# Patient Record
Sex: FEMALE | URBAN - METROPOLITAN AREA
[De-identification: names, ages, dates, MRNs, and addresses within clinical notes are randomized per-mention and may not be internally consistent; named-entity substitution may affect disease eponyms.]

---

## 2017-01-18 ENCOUNTER — APPOINTMENT (RX ONLY)
Dept: URBAN - METROPOLITAN AREA CLINIC 24 | Facility: CLINIC | Age: 57
Setting detail: DERMATOLOGY
End: 2017-01-18

## 2017-01-18 DIAGNOSIS — Z41.9 ENCOUNTER FOR PROCEDURE FOR PURPOSES OTHER THAN REMEDYING HEALTH STATE, UNSPECIFIED: ICD-10-CM

## 2017-01-18 PROBLEM — E78.5 HYPERLIPIDEMIA, UNSPECIFIED: Status: ACTIVE | Noted: 2017-01-18

## 2017-01-18 PROBLEM — E03.9 HYPOTHYROIDISM, UNSPECIFIED: Status: ACTIVE | Noted: 2017-01-18

## 2017-01-18 PROBLEM — M12.9 ARTHROPATHY, UNSPECIFIED: Status: ACTIVE | Noted: 2017-01-18

## 2017-01-18 PROBLEM — F32.9 MAJOR DEPRESSIVE DISORDER, SINGLE EPISODE, UNSPECIFIED: Status: ACTIVE | Noted: 2017-01-18

## 2017-01-18 PROBLEM — F41.9 ANXIETY DISORDER, UNSPECIFIED: Status: ACTIVE | Noted: 2017-01-18

## 2017-01-18 PROCEDURE — ? GENTLELASE

## 2017-01-18 ASSESSMENT — LOCATION DETAILED DESCRIPTION DERM
LOCATION DETAILED: LEFT AXILLARY TAIL OF BREAST
LOCATION DETAILED: LEFT DISTAL PRETIBIAL REGION
LOCATION DETAILED: RIGHT ANTERIOR PROXIMAL THIGH
LOCATION DETAILED: RIGHT DISTAL CALF
LOCATION DETAILED: LEFT DISTAL CALF
LOCATION DETAILED: RIGHT ANTERIOR PROXIMAL UPPER ARM
LOCATION DETAILED: LEFT ANTERIOR PROXIMAL THIGH
LOCATION DETAILED: RIGHT DISTAL PRETIBIAL REGION

## 2017-01-18 ASSESSMENT — LOCATION SIMPLE DESCRIPTION DERM
LOCATION SIMPLE: LEFT CALF
LOCATION SIMPLE: LEFT BREAST
LOCATION SIMPLE: LEFT PRETIBIAL REGION
LOCATION SIMPLE: RIGHT UPPER ARM
LOCATION SIMPLE: RIGHT CALF
LOCATION SIMPLE: LEFT THIGH
LOCATION SIMPLE: RIGHT THIGH
LOCATION SIMPLE: RIGHT PRETIBIAL REGION

## 2017-01-18 ASSESSMENT — LOCATION ZONE DERM
LOCATION ZONE: TRUNK
LOCATION ZONE: ARM
LOCATION ZONE: LEG

## 2017-05-01 PROBLEM — J30.2 SEASONAL ALLERGIC RHINITIS: Status: ACTIVE | Noted: 2017-05-01

## 2017-06-23 PROBLEM — R91.1 PULMONARY NODULE: Status: ACTIVE | Noted: 2017-06-23

## 2017-10-07 PROBLEM — C44.91 BASAL CELL CARCINOMA: Status: ACTIVE | Noted: 2017-10-07

## 2017-10-09 ENCOUNTER — HOSPITAL ENCOUNTER (OUTPATIENT)
Dept: CT IMAGING | Age: 57
Discharge: HOME | End: 2017-10-09
Attending: INTERNAL MEDICINE
Payer: OTHER GOVERNMENT

## 2017-10-09 DIAGNOSIS — R91.1 PULMONARY NODULE: ICD-10-CM

## 2017-10-09 PROCEDURE — 71250 CT THORAX DX C-: CPT

## 2017-10-12 NOTE — PROGRESS NOTES
Chest CT scan did not reveal the presence of any nodules. Radiologist impression is that the lungs are clear. There appears to be resolution of possible lung nodules. He noted presence of a fatty liver.

## 2017-11-15 ENCOUNTER — APPOINTMENT (RX ONLY)
Dept: URBAN - METROPOLITAN AREA CLINIC 24 | Facility: CLINIC | Age: 57
Setting detail: DERMATOLOGY
End: 2017-11-15

## 2017-11-15 DIAGNOSIS — Z41.9 ENCOUNTER FOR PROCEDURE FOR PURPOSES OTHER THAN REMEDYING HEALTH STATE, UNSPECIFIED: ICD-10-CM

## 2017-11-15 PROCEDURE — ? GENTLELASE

## 2017-11-15 ASSESSMENT — LOCATION DETAILED DESCRIPTION DERM
LOCATION DETAILED: LEFT DISTAL PRETIBIAL REGION
LOCATION DETAILED: RIGHT ANTERIOR PROXIMAL UPPER ARM
LOCATION DETAILED: RIGHT PROXIMAL PRETIBIAL REGION
LOCATION DETAILED: LEFT LATERAL BREAST 2-3:00 REGION

## 2017-11-15 ASSESSMENT — LOCATION SIMPLE DESCRIPTION DERM
LOCATION SIMPLE: LEFT BREAST
LOCATION SIMPLE: RIGHT PRETIBIAL REGION
LOCATION SIMPLE: LEFT PRETIBIAL REGION
LOCATION SIMPLE: RIGHT UPPER ARM

## 2017-11-15 ASSESSMENT — LOCATION ZONE DERM
LOCATION ZONE: LEG
LOCATION ZONE: TRUNK
LOCATION ZONE: ARM

## 2017-11-15 NOTE — PROCEDURE: GENTLELASE
Endpoint: Seborrheic Keratoses: Immediate endpoint: perilesional erythema and edema. Vaseline and ice applied. Post care reviewed with patient.
External Cooling Fan Speed: 1
Cooling: DCD 50/50
Indication: Laser Hair Removal
Fluence: 16
Laser Type: Alexandrite 755nm
External Cooling: Juan Cryo 6
Spot Size: 18 mm
Pulse Duration: 3 ms
Consent: Written consent obtained, risks reviewed including but not limited to crusting, scabbing, blistering, scarring, darker or lighter pigmentary change, paradoxical hair regrowth, incomplete removal of hair and infection.
Total Pulses: 42
Detail Level: Detailed
Endpoint: Laser Hair Removal: Immediate endpoint: perifollicular erythema and edema. Vaseline and ice applied. Post care reviewed with patient.\\n\\n
Post-Care Instructions: I reviewed with the patient in detail post-care instructions. Patient should avoid sun for a minimum of 4 weeks before and after treatment.

## 2018-02-12 ENCOUNTER — APPOINTMENT (RX ONLY)
Dept: URBAN - METROPOLITAN AREA CLINIC 24 | Facility: CLINIC | Age: 58
Setting detail: DERMATOLOGY
End: 2018-02-12

## 2018-02-12 DIAGNOSIS — Z41.9 ENCOUNTER FOR PROCEDURE FOR PURPOSES OTHER THAN REMEDYING HEALTH STATE, UNSPECIFIED: ICD-10-CM

## 2018-02-12 PROBLEM — M12.9 ARTHROPATHY, UNSPECIFIED: Status: ACTIVE | Noted: 2018-02-12

## 2018-02-12 PROBLEM — F41.9 ANXIETY DISORDER, UNSPECIFIED: Status: ACTIVE | Noted: 2018-02-12

## 2018-02-12 PROBLEM — L55.1 SUNBURN OF SECOND DEGREE: Status: ACTIVE | Noted: 2018-02-12

## 2018-02-12 PROCEDURE — ? GENTLELASE

## 2018-02-12 ASSESSMENT — LOCATION DETAILED DESCRIPTION DERM
LOCATION DETAILED: RIGHT INFERIOR MEDIAL MALAR CHEEK
LOCATION DETAILED: LEFT UPPER CUTANEOUS LIP

## 2018-02-12 ASSESSMENT — LOCATION ZONE DERM
LOCATION ZONE: LIP
LOCATION ZONE: FACE

## 2018-02-12 ASSESSMENT — LOCATION SIMPLE DESCRIPTION DERM
LOCATION SIMPLE: RIGHT CHEEK
LOCATION SIMPLE: LEFT LIP

## 2018-02-12 NOTE — PROCEDURE: GENTLELASE
Spot Size: 18 mm
Detail Level: Detailed
Total Pulses: 55
Endpoint: Lentigines: Immediate endpoint: perilesional erythema and edema. Vaseline and ice applied. Post care reviewed with patient.
Cooling: DCD 50/50
External Cooling Fan Speed: 1
Price (Use Numbers Only, No Special Characters Or $): 375.00
Indication: Laser Hair Removal
Endpoint: Laser Hair Removal: Immediate endpoint: perifollicular erythema and edema. Vaseline and ice applied. Post care reviewed with patient.
Pulse Duration: 3 ms
External Cooling: Juan Cryo 6
Post-Care Instructions: I reviewed with the patient in detail post-care instructions. Patient should avoid sun for a minimum of 4 weeks before and after treatment.
Laser Type: Alexandrite 755nm
Fluence: 10
Consent: Written consent obtained, risks reviewed including but not limited to crusting, scabbing, blistering, scarring, darker or lighter pigmentary change, paradoxical hair regrowth, incomplete removal of hair and infection.

## 2018-03-06 ENCOUNTER — APPOINTMENT (RX ONLY)
Dept: URBAN - METROPOLITAN AREA CLINIC 24 | Facility: CLINIC | Age: 58
Setting detail: DERMATOLOGY
End: 2018-03-06

## 2018-03-06 DIAGNOSIS — Z41.9 ENCOUNTER FOR PROCEDURE FOR PURPOSES OTHER THAN REMEDYING HEALTH STATE, UNSPECIFIED: ICD-10-CM

## 2018-03-06 PROBLEM — E78.5 HYPERLIPIDEMIA, UNSPECIFIED: Status: ACTIVE | Noted: 2018-03-06

## 2018-03-06 PROBLEM — L70.0 ACNE VULGARIS: Status: ACTIVE | Noted: 2018-03-06

## 2018-03-06 PROCEDURE — ? GENTLELASE

## 2018-03-06 ASSESSMENT — LOCATION SIMPLE DESCRIPTION DERM: LOCATION SIMPLE: LEFT LIP

## 2018-03-06 ASSESSMENT — LOCATION ZONE DERM: LOCATION ZONE: LIP

## 2018-03-06 ASSESSMENT — LOCATION DETAILED DESCRIPTION DERM: LOCATION DETAILED: LEFT SUPERIOR VERMILION LIP

## 2018-03-06 NOTE — PROCEDURE: GENTLELASE
Indication: Laser Hair Removal
Consent: Written consent obtained, risks reviewed including but not limited to crusting, scabbing, blistering, scarring, darker or lighter pigmentary change, paradoxical hair regrowth, incomplete removal of hair and infection.
Total Pulses: 39
Spot Size: 18 mm
External Cooling Fan Speed: 1
Endpoint: Seborrheic Keratoses: Immediate endpoint: perilesional erythema and edema. Vaseline and ice applied. Post care reviewed with patient.
Cooling: DCD 50/50
Fluence: 10
Laser Type: Alexandrite 755nm
Post-Care Instructions: I reviewed with the patient in detail post-care instructions. Patient should avoid sun for a minimum of 4 weeks before and after treatment.
Endpoint: Laser Hair Removal: Immediate endpoint: perifollicular erythema and edema. Vaseline and ice applied. Post care reviewed with patient.
External Cooling: Juan Cryo 6
Detail Level: Detailed
Pulse Duration: 3 ms

## 2018-04-09 ENCOUNTER — APPOINTMENT (RX ONLY)
Dept: URBAN - METROPOLITAN AREA CLINIC 24 | Facility: CLINIC | Age: 58
Setting detail: DERMATOLOGY
End: 2018-04-09

## 2018-04-09 DIAGNOSIS — Z41.9 ENCOUNTER FOR PROCEDURE FOR PURPOSES OTHER THAN REMEDYING HEALTH STATE, UNSPECIFIED: ICD-10-CM

## 2018-04-09 PROCEDURE — ? GENTLELASE

## 2018-04-09 ASSESSMENT — LOCATION SIMPLE DESCRIPTION DERM
LOCATION SIMPLE: LEFT LIP
LOCATION SIMPLE: RIGHT LIP

## 2018-04-09 ASSESSMENT — LOCATION DETAILED DESCRIPTION DERM
LOCATION DETAILED: RIGHT UPPER CUTANEOUS LIP
LOCATION DETAILED: LEFT UPPER CUTANEOUS LIP

## 2018-04-09 ASSESSMENT — LOCATION ZONE DERM: LOCATION ZONE: LIP

## 2018-04-09 NOTE — PROCEDURE: GENTLELASE
Endpoint: Laser Hair Removal: Immediate endpoint: perifollicular erythema and edema. Vaseline and ice applied. Post care reviewed with patient.
Post-Care Instructions: I reviewed with the patient in detail post-care instructions. Patient should avoid sun for a minimum of 4 weeks before and after treatment.
External Cooling: Juan Cryo 6
Indication: Laser Hair Removal
Endpoint: Lentigines: Immediate endpoint: perilesional erythema and edema. Vaseline and ice applied. Post care reviewed with patient.
Total Pulses: 230
Pulse Duration: 3 ms
Detail Level: Detailed
Cooling: DCD 50/50
External Cooling Fan Speed: 1
Fluence: 14
Consent: Written consent obtained, risks reviewed including but not limited to crusting, scabbing, blistering, scarring, darker or lighter pigmentary change, paradoxical hair regrowth, incomplete removal of hair and infection.
Laser Type: Alexandrite 755nm
Spot Size: 18 mm

## 2018-05-07 ENCOUNTER — APPOINTMENT (RX ONLY)
Dept: URBAN - METROPOLITAN AREA CLINIC 24 | Facility: CLINIC | Age: 58
Setting detail: DERMATOLOGY
End: 2018-05-07

## 2018-05-07 DIAGNOSIS — Z41.9 ENCOUNTER FOR PROCEDURE FOR PURPOSES OTHER THAN REMEDYING HEALTH STATE, UNSPECIFIED: ICD-10-CM

## 2018-05-07 PROBLEM — M12.9 ARTHROPATHY, UNSPECIFIED: Status: ACTIVE | Noted: 2018-05-07

## 2018-05-07 PROBLEM — E03.9 HYPOTHYROIDISM, UNSPECIFIED: Status: ACTIVE | Noted: 2018-05-07

## 2018-05-07 PROBLEM — L55.1 SUNBURN OF SECOND DEGREE: Status: ACTIVE | Noted: 2018-05-07

## 2018-05-07 PROCEDURE — ? GENTLELASE

## 2018-05-07 ASSESSMENT — LOCATION DETAILED DESCRIPTION DERM
LOCATION DETAILED: LEFT UPPER CUTANEOUS LIP
LOCATION DETAILED: RIGHT SUPERIOR MEDIAL BUCCAL CHEEK
LOCATION DETAILED: PHILTRUM

## 2018-05-07 ASSESSMENT — LOCATION SIMPLE DESCRIPTION DERM
LOCATION SIMPLE: LEFT LIP
LOCATION SIMPLE: RIGHT CHEEK
LOCATION SIMPLE: UPPER LIP

## 2018-05-07 ASSESSMENT — LOCATION ZONE DERM
LOCATION ZONE: FACE
LOCATION ZONE: LIP

## 2018-05-07 NOTE — PROCEDURE: GENTLELASE
External Cooling Fan Speed: 1
Total Pulses: 15
Cooling: DCD 50/50
Post-Care Instructions: I reviewed with the patient in detail post-care instructions. Patient should avoid sun for a minimum of 4 weeks before and after treatment.
Detail Level: Detailed
Laser Type: Alexandrite 755nm
Fluence: 12
Endpoint: Lentigines: Immediate endpoint: perilesional erythema and edema. Vaseline and ice applied. Post care reviewed with patient.
Indication: Laser Hair Removal
Pulse Duration: 3 ms
External Cooling: Ujan Cryo 6
Consent: Written consent obtained, risks reviewed including but not limited to crusting, scabbing, blistering, scarring, darker or lighter pigmentary change, paradoxical hair regrowth, incomplete removal of hair and infection.
Endpoint: Laser Hair Removal: Immediate endpoint: perifollicular erythema and edema. Vaseline and ice applied. Post care reviewed with patient.
Spot Size: 18 mm

## 2018-06-11 ENCOUNTER — APPOINTMENT (RX ONLY)
Dept: URBAN - METROPOLITAN AREA CLINIC 24 | Facility: CLINIC | Age: 58
Setting detail: DERMATOLOGY
End: 2018-06-11

## 2018-06-11 DIAGNOSIS — Z41.9 ENCOUNTER FOR PROCEDURE FOR PURPOSES OTHER THAN REMEDYING HEALTH STATE, UNSPECIFIED: ICD-10-CM

## 2018-06-11 PROBLEM — F32.9 MAJOR DEPRESSIVE DISORDER, SINGLE EPISODE, UNSPECIFIED: Status: ACTIVE | Noted: 2018-06-11

## 2018-06-11 PROBLEM — L70.0 ACNE VULGARIS: Status: ACTIVE | Noted: 2018-06-11

## 2018-06-11 PROBLEM — F41.9 ANXIETY DISORDER, UNSPECIFIED: Status: ACTIVE | Noted: 2018-06-11

## 2018-06-11 PROBLEM — E03.9 HYPOTHYROIDISM, UNSPECIFIED: Status: ACTIVE | Noted: 2018-06-11

## 2018-06-11 PROBLEM — L55.1 SUNBURN OF SECOND DEGREE: Status: ACTIVE | Noted: 2018-06-11

## 2018-06-11 PROBLEM — M12.9 ARTHROPATHY, UNSPECIFIED: Status: ACTIVE | Noted: 2018-06-11

## 2018-06-11 PROBLEM — E78.5 HYPERLIPIDEMIA, UNSPECIFIED: Status: ACTIVE | Noted: 2018-06-11

## 2018-06-11 PROCEDURE — ? GENTLELASE

## 2018-06-11 ASSESSMENT — LOCATION DETAILED DESCRIPTION DERM
LOCATION DETAILED: RIGHT UPPER CUTANEOUS LIP
LOCATION DETAILED: PHILTRUM
LOCATION DETAILED: LEFT SUPERIOR MEDIAL BUCCAL CHEEK

## 2018-06-11 ASSESSMENT — LOCATION ZONE DERM
LOCATION ZONE: FACE
LOCATION ZONE: LIP

## 2018-06-11 ASSESSMENT — LOCATION SIMPLE DESCRIPTION DERM
LOCATION SIMPLE: RIGHT LIP
LOCATION SIMPLE: UPPER LIP
LOCATION SIMPLE: LEFT CHEEK

## 2018-06-11 NOTE — PROCEDURE: GENTLELASE
Consent: Written consent obtained, risks reviewed including but not limited to crusting, scabbing, blistering, scarring, darker or lighter pigmentary change, paradoxical hair regrowth, incomplete removal of hair and infection.
Pulse Duration: 3 ms
Laser Type: Alexandrite 755nm
Endpoint: Lentigines: Immediate endpoint: perilesional erythema and edema. Vaseline and ice applied. Post care reviewed with patient.
Fluence: 12
Post-Care Instructions: I reviewed with the patient in detail post-care instructions. Patient should avoid sun for a minimum of 4 weeks before and after treatment.
Spot Size: 18 mm
Detail Level: Detailed
Endpoint: Laser Hair Removal: Immediate endpoint: perifollicular erythema and edema. Vaseline and ice applied. Post care reviewed with patient.
External Cooling: Juan Cryo 6
External Cooling Fan Speed: 1
Cooling: DCD 50/50
Indication: Laser Hair Removal
Total Pulses: 11

## 2018-07-12 ENCOUNTER — APPOINTMENT (RX ONLY)
Dept: URBAN - METROPOLITAN AREA CLINIC 24 | Facility: CLINIC | Age: 58
Setting detail: DERMATOLOGY
End: 2018-07-12

## 2018-07-12 DIAGNOSIS — Z41.9 ENCOUNTER FOR PROCEDURE FOR PURPOSES OTHER THAN REMEDYING HEALTH STATE, UNSPECIFIED: ICD-10-CM

## 2018-07-12 PROCEDURE — ? GENTLELASE

## 2018-07-12 ASSESSMENT — LOCATION DETAILED DESCRIPTION DERM
LOCATION DETAILED: RIGHT LOWER CUTANEOUS LIP
LOCATION DETAILED: LEFT SUPERIOR CENTRAL BUCCAL CHEEK
LOCATION DETAILED: PHILTRUM

## 2018-07-12 ASSESSMENT — LOCATION ZONE DERM
LOCATION ZONE: FACE
LOCATION ZONE: LIP

## 2018-07-12 ASSESSMENT — LOCATION SIMPLE DESCRIPTION DERM
LOCATION SIMPLE: UPPER LIP
LOCATION SIMPLE: RIGHT LIP
LOCATION SIMPLE: LEFT CHEEK

## 2018-07-12 NOTE — PROCEDURE: GENTLELASE
Laser Type: Alexandrite 755nm
Total Pulses: 15
Fluence: 10
Detail Level: Detailed
External Cooling Fan Speed: 1
Consent: Written consent obtained, risks reviewed including but not limited to crusting, scabbing, blistering, scarring, darker or lighter pigmentary change, paradoxical hair regrowth, incomplete removal of hair and infection.
Endpoint: Laser Hair Removal: Immediate endpoint: perifollicular erythema and edema. Vaseline and ice applied. Post care reviewed with patient.
Pulse Duration: 3 ms
Spot Size: 18 mm
Post-Care Instructions: I reviewed with the patient in detail post-care instructions. Patient should avoid sun for a minimum of 4 weeks before and after treatment.
External Cooling: Juan Cryo 6
Indication: Laser Hair Removal
Cooling: DCD 50/50
Endpoint: Seborrheic Keratoses: Immediate endpoint: perilesional erythema and edema. Vaseline and ice applied. Post care reviewed with patient.

## 2018-08-23 ENCOUNTER — APPOINTMENT (RX ONLY)
Dept: URBAN - METROPOLITAN AREA CLINIC 24 | Facility: CLINIC | Age: 58
Setting detail: DERMATOLOGY
End: 2018-08-23

## 2018-08-23 DIAGNOSIS — Z41.9 ENCOUNTER FOR PROCEDURE FOR PURPOSES OTHER THAN REMEDYING HEALTH STATE, UNSPECIFIED: ICD-10-CM

## 2018-08-23 PROBLEM — F41.9 ANXIETY DISORDER, UNSPECIFIED: Status: ACTIVE | Noted: 2018-08-23

## 2018-08-23 PROCEDURE — ? GENTLELASE

## 2018-08-23 ASSESSMENT — LOCATION ZONE DERM: LOCATION ZONE: LIP

## 2018-08-23 ASSESSMENT — LOCATION DETAILED DESCRIPTION DERM: LOCATION DETAILED: RIGHT UPPER CUTANEOUS LIP

## 2018-08-23 ASSESSMENT — LOCATION SIMPLE DESCRIPTION DERM: LOCATION SIMPLE: RIGHT LIP

## 2018-08-23 NOTE — PROCEDURE: GENTLELASE
Consent: Written consent obtained, risks reviewed including but not limited to crusting, scabbing, blistering, scarring, darker or lighter pigmentary change, paradoxical hair regrowth, incomplete removal of hair and infection.
Fluence: 12
External Cooling Fan Speed: 1
Detail Level: Detailed
Cooling: DCD 50/50
Laser Type: Alexandrite 755nm
External Cooling: Juan Cryo 6
Endpoint: Seborrheic Keratoses: Immediate endpoint: perilesional erythema and edema. Vaseline and ice applied. Post care reviewed with patient.
Spot Size: 18 mm
Post-Care Instructions: I reviewed with the patient in detail post-care instructions. Patient should avoid sun for a minimum of 4 weeks before and after treatment.
Pulse Duration: 3 ms
Total Pulses: 35
Indication: Laser Hair Removal
Endpoint: Laser Hair Removal: Immediate endpoint: perifollicular erythema and edema. Vaseline and ice applied. Post care reviewed with patient.

## 2018-10-22 ENCOUNTER — APPOINTMENT (RX ONLY)
Dept: URBAN - METROPOLITAN AREA CLINIC 23 | Facility: CLINIC | Age: 58
Setting detail: DERMATOLOGY
End: 2018-10-22

## 2018-10-22 DIAGNOSIS — Z41.9 ENCOUNTER FOR PROCEDURE FOR PURPOSES OTHER THAN REMEDYING HEALTH STATE, UNSPECIFIED: ICD-10-CM

## 2018-10-22 PROBLEM — L70.0 ACNE VULGARIS: Status: ACTIVE | Noted: 2018-10-22

## 2018-10-22 PROCEDURE — ? GENTLELASE

## 2018-10-22 ASSESSMENT — LOCATION SIMPLE DESCRIPTION DERM
LOCATION SIMPLE: RIGHT PRETIBIAL REGION
LOCATION SIMPLE: LEFT PRETIBIAL REGION
LOCATION SIMPLE: UPPER LIP

## 2018-10-22 ASSESSMENT — LOCATION ZONE DERM
LOCATION ZONE: LIP
LOCATION ZONE: LEG

## 2018-10-22 ASSESSMENT — LOCATION DETAILED DESCRIPTION DERM
LOCATION DETAILED: RIGHT PROXIMAL PRETIBIAL REGION
LOCATION DETAILED: LEFT PROXIMAL PRETIBIAL REGION
LOCATION DETAILED: PHILTRUM

## 2018-10-22 NOTE — PROCEDURE: GENTLELASE
Endpoint: Laser Hair Removal: Immediate endpoint: perifollicular erythema and edema. Vaseline and ice applied. Post care reviewed with patient.
Consent: Written consent obtained, risks reviewed including but not limited to crusting, scabbing, blistering, scarring, darker or lighter pigmentary change, paradoxical hair regrowth, incomplete removal of hair and infection.
Laser Type: Alexandrite 755nm
Endpoint: Seborrheic Keratoses: Immediate endpoint: perilesional erythema and edema. Vaseline and ice applied. Post care reviewed with patient.
Cooling: DCD 50/50
External Cooling: Juan Cryo 6
Total Pulses: 185
Detail Level: Detailed
Spot Size: 18 mm
Fluence: 16
Pulse Duration: 3 ms
Indication: Laser Hair Removal
Post-Care Instructions: I reviewed with the patient in detail post-care instructions. Patient should avoid sun for a minimum of 4 weeks before and after treatment.
External Cooling Fan Speed: 1

## 2019-01-15 ENCOUNTER — APPOINTMENT (RX ONLY)
Dept: URBAN - METROPOLITAN AREA CLINIC 23 | Facility: CLINIC | Age: 59
Setting detail: DERMATOLOGY
End: 2019-01-15

## 2019-01-15 DIAGNOSIS — Z41.9 ENCOUNTER FOR PROCEDURE FOR PURPOSES OTHER THAN REMEDYING HEALTH STATE, UNSPECIFIED: ICD-10-CM

## 2019-01-15 PROCEDURE — ? GENTLELASE

## 2019-01-15 ASSESSMENT — LOCATION DETAILED DESCRIPTION DERM
LOCATION DETAILED: RIGHT LATERAL MALAR CHEEK
LOCATION DETAILED: RIGHT ANKLE
LOCATION DETAILED: LEFT POSTERIOR ANKLE
LOCATION DETAILED: RIGHT PROXIMAL PRETIBIAL REGION
LOCATION DETAILED: RIGHT CHIN
LOCATION DETAILED: LEFT CENTRAL BUCCAL CHEEK
LOCATION DETAILED: LEFT UPPER CUTANEOUS LIP
LOCATION DETAILED: LEFT INFERIOR CENTRAL MALAR CHEEK
LOCATION DETAILED: LEFT PROXIMAL CALF
LOCATION DETAILED: RIGHT SUPERIOR LATERAL BUCCAL CHEEK

## 2019-01-15 ASSESSMENT — LOCATION SIMPLE DESCRIPTION DERM
LOCATION SIMPLE: LEFT CALF
LOCATION SIMPLE: RIGHT CHEEK
LOCATION SIMPLE: LEFT CHEEK
LOCATION SIMPLE: LEFT LIP
LOCATION SIMPLE: LEFT ANKLE
LOCATION SIMPLE: CHIN
LOCATION SIMPLE: RIGHT PRETIBIAL REGION
LOCATION SIMPLE: RIGHT ANKLE

## 2019-01-15 ASSESSMENT — LOCATION ZONE DERM
LOCATION ZONE: FACE
LOCATION ZONE: LIP
LOCATION ZONE: LEG

## 2019-01-15 NOTE — PROCEDURE: GENTLELASE
Indication: Laser Hair Removal
Total Pulses: 50
Price (Use Numbers Only, No Special Characters Or $): 0
Detail Level: Detailed
Endpoint: Seborrheic Keratoses: Immediate endpoint: perilesional erythema and edema. Vaseline and ice applied. Post care reviewed with patient.
Spot Size: 18 mm
Endpoint: Laser Hair Removal: Immediate endpoint: perifollicular erythema and edema. Vaseline and ice applied. Post care reviewed with patient.
Post-Care Instructions: I reviewed with the patient in detail post-care instructions. Patient should avoid sun for a minimum of 4 weeks before and after treatment.
Laser Type: Alexandrite 755nm
Cooling: DCD setting
Pulse Duration: 3 ms
Consent: Written consent obtained, risks reviewed including but not limited to crusting, scabbing, blistering, scarring, darker or lighter pigmentary change, paradoxical hair regrowth, incomplete removal of hair and infection.
Fluence: 14

## 2019-01-28 PROBLEM — N94.10 DYSPAREUNIA, FEMALE: Status: ACTIVE | Noted: 2019-01-28

## 2019-01-28 PROBLEM — N89.6 TIGHT INTROITUS: Status: ACTIVE | Noted: 2019-01-28

## 2019-03-18 ENCOUNTER — HOSPITAL ENCOUNTER (OUTPATIENT)
Dept: DIABETES SERVICES | Age: 59
Discharge: HOME | End: 2019-03-18
Payer: OTHER GOVERNMENT

## 2019-03-18 PROCEDURE — G0108 DIAB MANAGE TRN  PER INDIV: HCPCS

## 2019-04-04 ENCOUNTER — HOSPITAL ENCOUNTER (OUTPATIENT)
Dept: DIABETES SERVICES | Age: 59
Discharge: HOME | End: 2019-04-04
Payer: OTHER GOVERNMENT

## 2019-04-04 PROCEDURE — G0109 DIAB MANAGE TRN IND/GROUP: HCPCS

## 2019-04-09 ENCOUNTER — HOSPITAL ENCOUNTER (OUTPATIENT)
Dept: DIABETES SERVICES | Age: 59
Discharge: HOME | End: 2019-04-09
Payer: OTHER GOVERNMENT

## 2019-04-09 PROCEDURE — G0109 DIAB MANAGE TRN IND/GROUP: HCPCS

## 2019-04-09 NOTE — PROGRESS NOTES
Participant attended Diabetes #1 session today. Topics included: Characteristics/pathophysiology type 1/type 2 diabetes; Goal/acceptable blood glucose ranges/Hgb A1C/interpreting/using results;meters, continuous glucose monitors and insulin pumps. Using medications safely; Sick day management; Prevention/detection/treatment of acute complications. - Verbalized understanding of material covered. -Goal for next session Nutrition Two  
-Anticipated adherence is good  
-Problems/barriers may be none anticipated

## 2019-04-26 ENCOUNTER — HOSPITAL ENCOUNTER (OUTPATIENT)
Dept: DIABETES SERVICES | Age: 59
Discharge: HOME | End: 2019-04-26
Payer: OTHER GOVERNMENT

## 2019-04-26 PROBLEM — K76.0 HEPATIC STEATOSIS DETERMINED BY BIOPSY OF LIVER: Status: ACTIVE | Noted: 2019-04-26

## 2019-04-26 PROBLEM — L98.9 HAND LESION: Status: ACTIVE | Noted: 2019-04-26

## 2019-04-26 PROCEDURE — G0109 DIAB MANAGE TRN IND/GROUP: HCPCS

## 2019-04-26 NOTE — PROGRESS NOTES
Attended nutrition diabetes #2 group session today. Topics included: plate method for portion control; fiber and sodium guidelines; sugar substitutes; alcohol; eating out; recipe modification; label reading. Participant's nutrition goal:To make heart healthier choices and lose 5 lbs, she will continue to use a 9 inch plate size at dinner and re-evaluate in 3 months. Participant's nutrition support plan:use the grocery shopping guide Barriers identified by participant: eating out Voiced/demonstrated understanding of material covered. Anticipated adherence is good. Plan for follow up is attend diabetes 2 class.

## 2019-04-30 ENCOUNTER — TELEPHONE (OUTPATIENT)
Dept: DIABETES SERVICES | Age: 59
End: 2019-04-30

## 2019-05-06 ENCOUNTER — HOSPITAL ENCOUNTER (OUTPATIENT)
Dept: ULTRASOUND IMAGING | Age: 59
Discharge: HOME | End: 2019-05-06
Attending: INTERNAL MEDICINE

## 2019-05-06 DIAGNOSIS — K76.0 HEPATIC STEATOSIS DETERMINED BY BIOPSY OF LIVER: ICD-10-CM

## 2019-07-24 ENCOUNTER — HOSPITAL ENCOUNTER (OUTPATIENT)
Dept: DIABETES SERVICES | Age: 59
Discharge: HOME | End: 2019-07-24
Payer: OTHER GOVERNMENT

## 2019-07-24 PROCEDURE — G0109 DIAB MANAGE TRN IND/GROUP: HCPCS

## 2019-07-24 NOTE — PROGRESS NOTES
Participant attended Diabetes #2 session today. Topics included: Prevention/detection/treatment of chronic complications; sleep apnea; Developing strategies to promote health/change behavior/recommended screenings; Developing strategies to address psychosocial issues; Goal setting. Participants goal/support plan includes  Diabetes Goal:  To find my trigger food I will check my blood sugars before and after meals and before bed and test 2-8 times a day for a month and then decide if check less often. Diabetes Plan: Have testing strips. Problems/barriers may be:none anticipated   Plan for follow up/Recommendations: mail follow up survey at 6 months and one year.

## 2020-11-03 LAB — MAMMOGRAPHY, EXTERNAL: NORMAL

## 2021-03-16 PROBLEM — G43.009 MIGRAINE WITHOUT AURA AND WITHOUT STATUS MIGRAINOSUS, NOT INTRACTABLE: Status: ACTIVE | Noted: 2021-03-16

## 2021-04-13 PROBLEM — E11.65 TYPE 2 DIABETES MELLITUS WITH HYPERGLYCEMIA, WITHOUT LONG-TERM CURRENT USE OF INSULIN (HCC): Status: ACTIVE | Noted: 2021-04-13

## 2021-04-28 ENCOUNTER — HOSPITAL ENCOUNTER (OUTPATIENT)
Dept: LAB | Age: 61
Discharge: HOME | End: 2021-04-28
Payer: OTHER GOVERNMENT

## 2021-04-28 DIAGNOSIS — E89.0 HYPOTHYROIDISM FOLLOWING RADIOIODINE THERAPY: ICD-10-CM

## 2021-04-28 LAB
T4 FREE SERPL-MCNC: 1.1 NG/DL (ref 0.9–1.8)
TSH SERPL DL<=0.005 MIU/L-ACNC: 7.38 UIU/ML (ref 0.36–3.74)

## 2021-04-28 PROCEDURE — 84443 ASSAY THYROID STIM HORMONE: CPT

## 2021-04-28 PROCEDURE — 36415 COLL VENOUS BLD VENIPUNCTURE: CPT

## 2021-04-28 PROCEDURE — 84439 ASSAY OF FREE THYROXINE: CPT

## 2021-07-26 ENCOUNTER — HOSPITAL ENCOUNTER (OUTPATIENT)
Dept: LAB | Age: 61
Discharge: HOME | End: 2021-07-26
Payer: OTHER GOVERNMENT

## 2021-07-26 DIAGNOSIS — E89.0 HYPOTHYROIDISM FOLLOWING RADIOIODINE THERAPY: ICD-10-CM

## 2021-07-26 LAB
T4 FREE SERPL-MCNC: 1.1 NG/DL (ref 0.9–1.8)
TSH SERPL DL<=0.005 MIU/L-ACNC: 1.23 UIU/ML (ref 0.36–3.74)

## 2021-07-26 PROCEDURE — 36415 COLL VENOUS BLD VENIPUNCTURE: CPT

## 2021-07-26 PROCEDURE — 84443 ASSAY THYROID STIM HORMONE: CPT

## 2021-07-26 PROCEDURE — 84439 ASSAY OF FREE THYROXINE: CPT

## 2021-11-23 LAB — MAMMOGRAPHY, EXTERNAL: NORMAL

## 2021-12-13 ENCOUNTER — HOSPITAL ENCOUNTER (OUTPATIENT)
Dept: GENERAL RADIOLOGY | Age: 61
Discharge: HOME | End: 2021-12-13

## 2021-12-13 DIAGNOSIS — M25.561 ACUTE PAIN OF RIGHT KNEE: ICD-10-CM

## 2021-12-13 DIAGNOSIS — M54.50 ACUTE RIGHT-SIDED LOW BACK PAIN, UNSPECIFIED WHETHER SCIATICA PRESENT: ICD-10-CM

## 2022-01-03 ENCOUNTER — HOSPITAL ENCOUNTER (OUTPATIENT)
Dept: PHYSICAL THERAPY | Age: 62
Discharge: HOME | End: 2022-01-03
Payer: OTHER GOVERNMENT

## 2022-01-03 DIAGNOSIS — M25.561 ACUTE PAIN OF RIGHT KNEE: ICD-10-CM

## 2022-01-03 DIAGNOSIS — M54.50 ACUTE RIGHT-SIDED LOW BACK PAIN, UNSPECIFIED WHETHER SCIATICA PRESENT: ICD-10-CM

## 2022-01-03 PROCEDURE — 97161 PT EVAL LOW COMPLEX 20 MIN: CPT

## 2022-01-03 PROCEDURE — 97110 THERAPEUTIC EXERCISES: CPT

## 2022-01-03 NOTE — PROGRESS NOTES
Jai Madera  : 1960  Primary: [facility]  Secondary:  [address] at [facility]  [address] [place], [address] [facility], [place], [address] [place] [address]  [phone]   [phone]      OUTPATIENT PHYSICAL THERAPY: Daily Treatment Note 1/3/2022    ICD-10: Pain in right knee (M25.561)             Low back pain (M54.4)             Other abnormalities of gait and mobility (R26.89)   Precautions: pre-diabetic, migraines  Allergies: Amoxicillin and Penicillins   TREATMENT PLAN:  Effective Dates: 1/3/2022 TO 3/4/2022 (60 days). Frequency/Duration: 2 times a week for 60 Day(s) MEDICAL/REFERRING DIAGNOSIS:  Acute pain of right knee [M25.561]  Acute right-sided low back pain, unspecified whether sciatica present [M54.50]   DATE OF ONSET: Patient reports her low back pain starting in November after lifting bags of mulch and working in her yard. REFERRING PHYSICIAN: Angella Bruner MD MD Orders: Evaluate and Treat   Return MD Appointment: 3/15/2022 for lab work, no appointment for low back/ knee     Pre-treatment Symptoms/Complaints:  Patient reports her  having back problems and being the one at home to move and carry heavy items which she feels contributed to her back pain. Reports feeling much better now than when it first started, but has constant low back pain and intermittent R knee pain depending on activities. Pain: Initial: Pain Intensity 1: 2  Pain Location 1: Back  Pain Orientation 1: Lower  Pain Intensity 2: 1  Pain Location 2: Knee  Pain Orientation 2: Right,Anterior  Post Session:  2/10   Medications Last Reviewed:  1/3/2022  Updated Objective Findings:  See evaluation note from today   TREATMENT:   THERAPEUTIC EXERCISE: (15 minutes):  Exercises per grid below to improve mobility, strength, balance and coordination.   Required moderate visual, verbal, manual and tactile cues to promote proper body alignment, promote proper body posture and promote proper body mechanics. Progressed resistance, range, repetitions and complexity of movement as indicated. Date:  1/3/2022 Date:   Date:     Activity/Exercise Parameters Parameters Parameters   LTR X 10 each      SKTC X 2 each     Hamstring stretch Strap, x 2 each     Piriformis stretch X 2 each                         Time spent with patient reviewing proper muscle recruitment and technique with exercises. MANUAL THERAPY: ( minutes): Joint mobilization, Soft tissue mobilization and Manipulation was utilized and necessary because of the patient's restricted joint motion, painful spasm, loss of articular motion and restricted motion of soft tissue   Consider soft tissue mobilization to lumbar region to decrease tightness and pain   Consider trigger point release to R piriformis to decrease tightness and spasms   Consider soft tissue mobilization to R lateral hip, gluteals, and piriformis to decrease pain and tightness    MODALITIES: (0 minutes):      none today     HEP: As above; handouts given to patient for all exercises. Treatment/Session Summary:    · Response to Treatment:  Patient with good understanding and tolerance for stretching exercises. Plan to progress flexibility, mobility, and stability as tolerated. .  · Communication/Consultation:  Spoke at length with patient about activity modification as needed, safety at home, and gradual stretching/ core stability program for pain  · Equipment provided today:  Patient given handout with above exercises for home  · Recommendations/Intent for next treatment session: Next visit will focus on pain control, manual therapy and modalities as needed, progression of flexibility, core stability program as tolerated, functional exercises.     Total Treatment Billable Duration:  45 minutes: 30 evaluation, 15 therapeutic exercise  PT Patient Time In/Time Out  Time In: 1870  Time Out: [facility]

## 2022-01-03 NOTE — THERAPY EVALUATION
Irineo Bowers  : 1960  Primary: [facility]  Secondary:  [address] at [facility]  [address] [place], [address] [place] [address], Iván villasenor, [address] [place] [address]  [phone]   [phone]       OUTPATIENT PHYSICAL THERAPY:Initial Assessment 1/3/2022    ICD-10: Pain in right knee (M25.561)             Low back pain (M54.4)             Other abnormalities of gait and mobility (R26.89)   Precautions: pre-diabetic, migraines  Allergies: Amoxicillin and Penicillins   TREATMENT PLAN:  Effective Dates: 1/3/2022 TO 3/4/2022 (60 days). Frequency/Duration: 2 times a week for 60 Day(s) MEDICAL/REFERRING DIAGNOSIS:  Acute pain of right knee [M25.561]  Acute right-sided low back pain, unspecified whether sciatica present [M54.50]   DATE OF ONSET: Patient reports her low back pain starting in November after lifting bags of mulch and working in her yard. REFERRING PHYSICIAN: Scotty Hardy MD MD Orders: Evaluate and Treat   Return MD Appointment: 3/15/2022 for lab work, no appointment for low back/ knee     INITIAL ASSESSMENT:  Ms. Anuradha Núñez is a 62 y.o. female presenting to physical therapy with complaints of  low back and R knee pain which began in November after mulching her yard. She reports her  having a back problem and having to do the heavy lifting at home which she feels contributed to her back pain. She has taken muscle relaxants with minimal change. X-ray indicative of mild osteoarthritis in her lumbar spine and R knee per chart review. Patient does report feeling better now than she did when it first started. Reports less R LE symptoms, but continues to have a constant ache in her lower back, intermittent R anterior knee pain, and occasional R LE symptoms. She is eager to reduce her symptoms and return to her prior activity level including yoga.  Patient presents with increased pain, decreased strength, decreased ROM, decreased flexibility, impaired gait, impaired transfer ability, decreased activity tolerance, and overall impaired functional mobility. Patient is a good candidate for skilled physical therapy interventions to include manual therapy, therapeutic exercise, balance training, gait training, transfer training, postural re-education, body mechanics training, and pain modalities as needed. PROBLEM LIST (Impacting functional limitations):  1. Decreased Strength  2. Decreased ADL/Functional Activities  3. Decreased Transfer Abilities  4. Decreased Ambulation Ability/Technique  5. Decreased Balance  6. Increased Pain  7. Decreased Activity Tolerance  8. Decreased Pacing Skills  9. Decreased Work Simplification/Energy Conservation Techniques  10. Decreased Flexibility/Joint Mobility INTERVENTIONS PLANNED: (Treatment may consist of any combination of the following)  1. Balance Exercise  2. Bed Mobility  3. Cold  4. Cryotherapy  5. Electrical Stimulation  6. Family Education  7. Gait Training  8. Heat  9. Home Exercise Program (HEP)  10. Manual Therapy  11. Neuromuscular Re-education/Strengthening  12. Range of Motion (ROM)  13. Therapeutic Activites  14. Therapeutic Exercise/Strengthening  15. Transfer Training  16. Ultrasound (US)  17. Trigger Point Dry Needling     GOALS: (Goals have been discussed and agreed upon with patient.)  Short-Term Functional Goals: Time Frame: 1/3/2022 to 2/3/2022  1. Patient demonstrates independence with home exercise program without verbal cueing provided by therapist.   2. Patient will report no more than 1/10 low back pain at rest in order to demonstrate improved self pain control and tolerance. 3. Patient will be educated in and demonstrate proper squat lift technique in order to reduce stress on bilateral LE and lumbar spine, improve safety, and reduce risk of injury. 4. Patient will report no R knee buckling since starting therapy in order to demonstrate improving stability and functional mobility.   Discharge Goals: Time Frame: 1/3/2022 to 3/4/2022  1. Patient will report minimal to no low back or R knee pain with car transfers, sit to stand, and bed mobility in order to improve quality of life. 2. Patient will be able to perform stairs with reciprocal pattern up and down with minimal R knee pain, even with carrying light weight, in order to improve negotiation around her home with groceries. 3. Patient will report no R LE symptoms associated with sciatic pain in order to demonstrate improved centralization of initial symptoms. 4. Patient will improve Modified Oswestry Scale score to less than 10/50.  5. Patient will improve Lower Extremity Functional Scale score to 57/80 from 49/80. Outcome Measure: Tool Used: Lower Extremity Functional Scale (LEFS)  Score:  Initial: 49/80 Most Recent: X/80 (Date: -- )   Interpretation of Score: 20 questions each scored on a 5 point scale with 0 representing \"extreme difficulty or unable to perform\" and 4 representing \"no difficulty\". The lower the score, the greater the functional disability. 80/80 represents no disability. Minimal detectable change is 9 points. Tool Used: Modified Oswestry Low Back Pain Questionnaire  Score:  Initial: 10/50  Most Recent: X/50 (Date: -- )   Interpretation of Score: Each section is scored on a 0-5 scale, 5 representing the greatest disability. The scores of each section are added together for a total score of 50. Medical Necessity:   · Patient is expected to demonstrate progress in strength, range of motion, balance, coordination and functional technique to increase independence with transfers, lifting, and stairs and improve safety during lifting, reaching, bending, stairs, and transfers. · Skilled intervention continues to be required due to constant low back pain with intermittent increases of R knee pain hindering functional mobility and quality of life.   Reason for Services/Other Comments:  · Patient continues to require skilled intervention due to increased low back and R knee pain impacting functional mobility and return to prior level of function and independence. .  Total Evaluation Duration: 30 minutes    Rehabilitation Potential For Stated Goals: Good  Regarding Vivien Eliezer Ram's therapy, I certify that the treatment plan above will be carried out by a therapist or under their direction. Thank you for this referral,  Sky Jimenez PT     Referring Physician Signature: Jey Chacon MD _______________________________ Date _____________             PAIN/SUBJECTIVE:    Initial: Pain Intensity 1: 2  Pain Location 1: Back  Pain Orientation 1: Lower  Pain Intensity 2: 1  Pain Location 2: Knee  Pain Orientation 2: Right,Anterior  Post Session:  2/10    HISTORY:    History of Injury/Illness (Reason for Referral):  Ms. Nik Hartley is a 27[address] [facility] y.o. female presenting to physical therapy with complaints of  low back and R knee pain which began in November after mulching her yard. She reports her  having a back problem and having to do the heavy lifting at home which she feels contributed to her back pain. She has taken muscle relaxers with minimal change. X-ray indicative of mild osteoarthritis in her lumbar spine and R knee per chart review. Patient does report feeling better now than she did when it first started. Reports less R LE symptoms, but continues to have a constant ache in her lower back, intermittent R anterior knee pain, and occasional R LE symptoms. She is eager to reduce her symptoms and return to her prior activity level including yoga. Patient presents with increased pain, decreased strength, decreased ROM, decreased flexibility, impaired gait, impaired transfer ability, decreased activity tolerance, and overall impaired functional mobility.   Past Medical History/Comorbidities:   Ms. Nik Hartley  has a past medical history of Arthritis, Asthma, Diabetes ([address] External hemorrhoids, History of Graves' disease, History of hyperthyroidism, Hyperlipidemia, Hypertension, Hypothyroidism following radioiodine therapy, and Prediabetes. Ms. Emilie Acuna  has a past surgical history that includes hx partial hysterectomy (2000); hx breast biopsy; hx carpal tunnel release (Right, 2014); hx tonsillectomy (as a child); hx  section (10/1987, 1990); and hx mohs procedure (~). Social History/Living Environment:     Patient lives in a private residence with her . There are 3 steps to enter with a handrail. Prior Level of Function/Work/Activity:         Part time book keeper  Dominant Side:         RIGHT    Ambulatory/Rehab Services H2 Model Falls Risk Assessment    Risk Factors:       No Risk Factors Identified Ability to Rise from Chair:       (1)  Pushes up, successful in one attempt    Falls Prevention Plan:       No modifications necessary    Total: (5 or greater = High Risk): 1     [facility] of [employer]. All Rights Reserved. [facility] [employer] Patent #[id number]. Federal Law prohibits the replication, distribution or use without written permission from [facility] of [address] [place] [address]    Current Medications:        Current Outpatient Medications:     atorvastatin (LIPITOR) 10 mg tablet, Take 1 Tablet by mouth daily. , Disp: 90 Tablet, Rfl: 3    meloxicam (MOBIC) 7.5 mg tablet, Take 1 Tablet by mouth daily for 30 days. , Disp: 30 Tablet, Rfl: 1    vilazodone (Viibryd) 40 mg tab tablet, Take 1 Tablet by mouth daily. , Disp: 90 Tablet, Rfl: 3    SUMAtriptan-naproxen (Treximet)  mg tablet, Take 1 Tab by mouth once as needed. May repeat 2 hours after first dose if symptoms persisit  Indications: a migraine headache, Disp: 9 Tablet, Rfl: 6    Synthroid 88 mcg tablet, Take 1 Tablet by mouth Daily (before breakfast). , Disp: 90 Tablet, Rfl: 3    metFORMIN (GLUCOPHAGE) 500 mg tablet, Take one tablet a day with evening meal, Disp: 90 Tablet, Rfl: 3    buPROPion XL (WELLBUTRIN XL) 300 mg XL tablet, Take 1 Tablet by mouth every morning., Disp: 90 Tablet, Rfl: 3    glucose blood VI test strips (FreeStyle Test) strip, 100 Each by Does Not Apply route See Admin Instructions. Per pt, she uses \"Free Style Lite Test Strips \"     Check BS once per day Indication: Diagnosis code: E11.9, Disp: 100 Strip, Rfl: 3    fluticasone propionate (FLONASE) 50 mcg/actuation nasal spray, 2 Sprays by Both Nostrils route daily. , Disp: 3 Bottle, Rfl: 3    glucose blood VI test strips (OneTouch Ultra Blue Test Strip) strip, Check BS once per day Indication: Diagnosis code: E11.9, Disp: 100 Strip, Rfl: 3    albuterol (ProAir HFA) 90 mcg/actuation inhaler, USE 2 INHALATIONS EVERY 6 HOURS AS NEEDED FOR WHEEZING, Disp: 1 Inhaler, Rfl: 0    Blood-Glucose Meter (ONETOUCH ULTRAMINI) monitoring kit, Check BS once per day. Indication: E11.9, Disp: 1 Kit, Rfl: 0    Lancets (ONETOUCH ULTRASOFT LANCETS) misc, Use once per day, Disp: 100 Each, Rfl: 3    Date Last Reviewed:  1/3/2022    Number of Personal Factors/Comorbidities that affect the Plan of Care:  Patient with minimal co-morbidities 1-2: MODERATE COMPLEXITY    EXAMINATION:    Patient denies any LE paresthesia. Patient denies any increase of symptoms with cough, sneeze or valsalva. Patient denies any saddle paresthesia or bowel/bladder deficits. Observation/Orthostatic Postural Assessment:          Patient with slightly widened base of support, decreased weight shift to R LE, slight R knee flexion in stance, decreased lumbar lordosis, slightly increased thoracic kyphosis, mild thoracic scoliosis. Palpation:          Moderate to significant tenderness to palpation of upper lumbar spine, mild to lower lumbar. Significant tenderness to R piriformis with moderate tightness noted. Minimal to moderate tenderness and tightness R IT band. ROM:    AROM (degrees)   Lumbar Flexion 60   Lumbar Extension 10     AROM/ PROM Left (degrees) Right (degrees)   Knee Flexion 145 145   Knee Extension 3 2     Strength:     Motion Tested Left   (*/5) Right  (*/5)   Hip Flexion 5 5   Hip Extension 4+ 4   Hip Abduction 4+ 4   Knee Flexion 4+ 4+   Knee Extension 5 4+   Ankle Dorsiflexion 5 5   Gross core strength 3/5 as observed with transfers. Special Tests:          Neural tension tests: Passive straight leg raise (SLR) test is negative bilaterally. Crossed SLR test is negative bilaterally. Slump test is negative. R knee meniscal testing negative  Passive Accessory Motion:         Significant limitation with posterior to anterior mobilizations of the lumbar spine and sacrum. Neurological Screen:              Myotomes: Key muscle strength testing through bilateral LE is [facility]. Dermatomes: Sensation to light touch for bilateral LE is intact from L1 to S2. Reflexes: Patellar (L3/ L4): 2+ on R, 3+ on L                 Achilles (S1/ S2): 3+ bilaterally  Abnormal reflexes: Clonus: negative bilaterally  Functional Mobility:         Gait/Ambulation: slow spenser with mild antalgia, decreased stance time R LE, increased lateral sway. Transfers:  Minimal to moderate use of bilateral UE for sit to stand transfers        Stairs:  Reports ascending stairs one at a time, especially when carrying groceries or other items. Reciprocal pattern descending stairs. Patient reports pain with initial walking, car transfers, and sometimes with bed mobility. Balance:          Sitting balance intact. Standing balance mildly limited      Body Structures Involved:  1. Nerves  2. Bones  3. Joints  4. Muscles  5. Ligaments Body Functions Affected:  1. Sensory/Pain  2. Neuromusculoskeletal  3. Movement Related Activities and Participation Affected:  1. General Tasks and Demands  2. Mobility  3. Domestic Life  4. Interpersonal Interactions and Relationships  5.  Community, Social and [place]    Number of elements (examined above) that affect the Plan of Care: 1-2: LOW COMPLEXITY    CLINICAL PRESENTATION:    Presentation:   Stable and uncomplicated: LOW COMPLEXITY    CLINICAL DECISION MAKING:    Use of outcome tool(s) and clinical judgement create a POC that gives a: Questionable prediction of patient's progress: MODERATE COMPLEXITY

## 2022-01-06 ENCOUNTER — HOSPITAL ENCOUNTER (OUTPATIENT)
Dept: PHYSICAL THERAPY | Age: 62
Discharge: HOME | End: 2022-01-06
Payer: OTHER GOVERNMENT

## 2022-01-06 PROCEDURE — 97110 THERAPEUTIC EXERCISES: CPT

## 2022-01-06 PROCEDURE — 97140 MANUAL THERAPY 1/> REGIONS: CPT

## 2022-01-06 NOTE — PROGRESS NOTES
Savannah Berger  : 1960  Primary: [facility]  Secondary:  [address] at [facility]  [address] [place], [address] [place] [address], [address]  [phone]   [phone]      OUTPATIENT PHYSICAL THERAPY: Daily Treatment Note 2022    ICD-10: Pain in right knee (M25.561)             Low back pain (M54.4)             Other abnormalities of gait and mobility (R26.89)   Precautions: pre-diabetic, migraines  Allergies: Amoxicillin and Penicillins   TREATMENT PLAN:  Effective Dates: 1/3/2022 TO 3/4/2022 (60 days). Frequency/Duration: 2 times a week for 60 Day(s) MEDICAL/REFERRING DIAGNOSIS:  Pain in right knee [M25.561]  Low back pain, unspecified [M54.50]   DATE OF ONSET: Patient reports her low back pain starting in November after lifting bags of mulch and working in her yard. REFERRING PHYSICIAN: Aaron Mensah MD MD Orders: Evaluate and Treat   Return MD Appointment: 3/15/2022 for lab work, no appointment for low back/ knee     Pre-treatment Symptoms/Complaints:  Patient reports increased back pain due sitting in her car for an hour however pain decreased once she got out and moved around. Reports mild low back pain today with no radiating pain. Pain: Initial: Pain Intensity 1: 2  Pain Location 1: Back  Pain Orientation 1: Lower,Mid  Pain Intensity 2: 1  Pain Location 2: Knee  Pain Orientation 2: Right  Post Session:  1/10   Medications Last Reviewed:  2022  Updated Objective Findings:  Level leg length   TREATMENT:   THERAPEUTIC EXERCISE: (25 minutes):  Exercises per grid below to improve mobility, strength, balance and coordination. Required moderate visual, verbal, manual and tactile cues to promote proper body alignment, promote proper body posture and promote proper body mechanics. Progressed resistance, range, repetitions and complexity of movement as indicated.      Date:  1/3/2022 Date:  22 Date:     Activity/Exercise Parameters Parameters Parameters   LTR X 10 each  2 x 2 minutes    SKTC X 2 each X 5 each    Hamstring stretch Strap, x 2 each Strap x  each    Piriformis stretch X 2 each X 5 each    Pelvic tilts  X 20     Transverse abdominus activation  Several reps            Time spent with patient reviewing proper muscle recruitment and technique with exercises. Educated in stabilization for ADL'S     MANUAL THERAPY: (28 minutes): Joint mobilization, Soft tissue mobilization and Manipulation was utilized and necessary because of the patient's restricted joint motion, painful spasm, loss of articular motion and restricted motion of soft tissue   Soft tissue mobilization to lumbar region to decrease tightness and pain   Trigger point release to R piriformis to decrease tightness and spasms Not today   Soft tissue mobilization to R lateral hip, gluteals, and piriformis to decrease pain and tightness    MODALITIES: (0 minutes):      none today     HEP: As above; handouts given to patient for all exercises. Treatment/Session Summary:    · Response to Treatment:  Reports decreased tightness and improved mobility after session. · Communication/Consultation:  HEP and core stabilization  · Equipment provided today:  None today  · Recommendations/Intent for next treatment session: Next visit will focus on pain control, manual therapy and modalities as needed, progression of flexibility, core stability program as tolerated, functional exercises.     Total Treatment Billable Duration:  53 minutes:   PT Patient Time In/Time Out  Time In: 0325  Time Out: Sacha Santiago PTA

## 2022-01-11 ENCOUNTER — HOSPITAL ENCOUNTER (OUTPATIENT)
Dept: PHYSICAL THERAPY | Age: 62
Discharge: HOME | End: 2022-01-11
Payer: OTHER GOVERNMENT

## 2022-01-11 PROCEDURE — 97140 MANUAL THERAPY 1/> REGIONS: CPT

## 2022-01-11 PROCEDURE — 97110 THERAPEUTIC EXERCISES: CPT

## 2022-01-11 NOTE — PROGRESS NOTES
Radha Perez  : 1960  Primary: [facility]  Secondary:  [address] at [facility]  [address] [place], [address] [place] [address], [facility], [address] [place] [address]  [phone]   [phone]      OUTPATIENT PHYSICAL THERAPY: Daily Treatment Note 2022    ICD-10: Pain in right knee (M25.561)             Low back pain (M54.4)             Other abnormalities of gait and mobility (R26.89)   Precautions: pre-diabetic, migraines  Allergies: Amoxicillin and Penicillins   TREATMENT PLAN:  Effective Dates: 1/3/2022 TO 3/4/2022 (60 days). Frequency/Duration: 2 times a week for 60 Day(s) MEDICAL/REFERRING DIAGNOSIS:  Pain in right knee [M25.561]  Low back pain, unspecified [M54.50]   DATE OF ONSET: Patient reports her low back pain starting in November after lifting bags of mulch and working in her yard. REFERRING PHYSICIAN: Chris Matamoros MD MD Orders: Evaluate and Treat   Return MD Appointment: 3/15/2022 for lab work, no appointment for low back/ knee     Pre-treatment Symptoms/Complaints:  Patient reports still having some pain in the center of her lower back. Reports taking an ibuprofen at 8 am.     Pain: Initial: Pain Intensity 1: 1  Pain Location 1: Back  Pain Orientation 1: Lower,Mid  Post Session:  1/10   Medications Last Reviewed:  2022  Updated Objective Findings:  Trigger points R gluteal and piriformis   TREATMENT:   THERAPEUTIC EXERCISE: (25 minutes):  Exercises per grid below to improve mobility, strength, balance and coordination. Required moderate visual, verbal, manual and tactile cues to promote proper body alignment, promote proper body posture and promote proper body mechanics. Progressed resistance, range, repetitions and complexity of movement as indicated.      Date:  1/3/2022 Date:  22 Date:  2022   Activity/Exercise Parameters Parameters Parameters   LTR X 10 each  2 x 2 minutes X 20 each side   SKTC X 2 each X 5 each 4 x 30 seconds each Hamstring stretch Strap, x 2 each Strap x  each Strap, 3 x 30 seconds each   Piriformis stretch X 2 each X 5 each 3 x 30 seconds each   Pelvic tilts  X 20  X 20   Transverse abdominus activation  Several reps 10 x 10 seconds   TA hip abduction --- --- Miguel Walker, x 10     Time spent with patient reviewing proper muscle recruitment and technique with exercises. Educated in stabilization for ADL'S     MANUAL THERAPY: (28 minutes): Joint mobilization, Soft tissue mobilization and Manipulation was utilized and necessary because of the patient's restricted joint motion, painful spasm, loss of articular motion and restricted motion of soft tissue   Soft tissue mobilization to lumbar region to decrease tightness and pain   Trigger point release to R piriformis to decrease tightness and spasms    Soft tissue mobilization to R lateral hip, gluteals, and piriformis to decrease pain and tightness   Prone posterior to anterior mobilizations of the thoracic and lumbar spine grades III-IV to improve mobility and decrease pain    MODALITIES: (0 minutes):      none today     HEP: As above; handouts given to patient for all exercises. Treatment/Session Summary:    · Response to Treatment:  Patient with decreased tightness at end of session. Improving with TA activation with cuing. .  · Communication/Consultation:  None today  · Equipment provided today:  None today  · Recommendations/Intent for next treatment session: Next visit will focus on pain control, manual therapy and modalities as needed, progression of flexibility, core stability program as tolerated, functional exercises.     Total Treatment Billable Duration:  53 minutes   PT Patient Time In/Time Out  Time In: 1108  Time Out: [address], PT

## 2022-01-13 ENCOUNTER — HOSPITAL ENCOUNTER (OUTPATIENT)
Dept: PHYSICAL THERAPY | Age: 62
Discharge: HOME | End: 2022-01-13
Payer: OTHER GOVERNMENT

## 2022-01-13 PROCEDURE — 97110 THERAPEUTIC EXERCISES: CPT

## 2022-01-13 PROCEDURE — 97140 MANUAL THERAPY 1/> REGIONS: CPT

## 2022-01-13 NOTE — PROGRESS NOTES
Nelida Watson  : 1960  Primary: [facility]  Secondary:  [address] at [facility]  [address], [address] [place] [address], [address]  [phone]   [phone]      OUTPATIENT PHYSICAL THERAPY: Daily Treatment Note 2022    ICD-10: Pain in right knee (M25.561)             Low back pain (M54.4)             Other abnormalities of gait and mobility (R26.89)   Precautions: pre-diabetic, migraines  Allergies: Amoxicillin and Penicillins   TREATMENT PLAN:  Effective Dates: 1/3/2022 TO 3/4/2022 (60 days). Frequency/Duration: 2 times a week for 60 Day(s) MEDICAL/REFERRING DIAGNOSIS:  Pain in right knee [M25.561]  Low back pain, unspecified [M54.50]   DATE OF ONSET: Patient reports her low back pain starting in November after lifting bags of mulch and working in her yard. REFERRING PHYSICIAN: Sherren Matte, MD MD Orders: Evaluate and Treat   Return MD Appointment: 3/15/2022 for lab work, no appointment for low back/ knee     Pre-treatment Symptoms/Complaints:  Patient reports having pain this morning in the center of her lower back. Reports feeling good after last session for a few days. Pain: Initial: Pain Intensity 1: 2  Pain Location 1: Back  Pain Orientation 1: Lower,Mid  Post Session:  1/10   Medications Last Reviewed:  2022  Updated Objective Findings:  Improving mobility through spine and musculature   TREATMENT:   THERAPEUTIC EXERCISE: (25 minutes):  Exercises per grid below to improve mobility, strength, balance and coordination. Required moderate visual, verbal, manual and tactile cues to promote proper body alignment, promote proper body posture and promote proper body mechanics. Progressed resistance, range, repetitions and complexity of movement as indicated.      Date:  2022 Date:  22 Date:  2022   Activity/Exercise Parameters Parameters Parameters   LTR X 20 each 2 x 2 minutes X 20 each side   SKTC 3 x 30 seconds each X 5 each 4 x 30 seconds each   Hamstring stretch Strap, 3 x 30 seconds each Strap x  each Strap, 3 x 30 seconds each   Piriformis stretch 3 x 30 seconds each X 5 each 3 x 30 seconds each   Pelvic tilts X 20 X 20  X 20   Transverse abdominus activation 10 x 10 seconds Several reps 10 x 10 seconds   TA hip abduction Green, 2 x 10 --- Green, x 10     Time spent with patient reviewing proper muscle recruitment and technique with exercises. Educated in stabilization for ADL'S     MANUAL THERAPY: (28 minutes): Joint mobilization, Soft tissue mobilization and Manipulation was utilized and necessary because of the patient's restricted joint motion, painful spasm, loss of articular motion and restricted motion of soft tissue   Soft tissue mobilization to lumbar region to decrease tightness and pain   Trigger point release to R piriformis to decrease tightness and spasms    Soft tissue mobilization to R lateral hip, gluteals, and piriformis to decrease pain and tightness   Prone posterior to anterior mobilizations of the thoracic and lumbar spine grades III-IV to improve mobility and decrease pain    MODALITIES: (0 minutes):      none today     HEP: As above; handouts given to patient for all exercises. Treatment/Session Summary:    · Response to Treatment:  Patient with decreased pain and improved mobility at end of treatment. Advised to continue with HEP daily. · Communication/Consultation:  None today  · Equipment provided today:  None today  · Recommendations/Intent for next treatment session: Next visit will focus on pain control, manual therapy and modalities as needed, progression of flexibility, core stability program as tolerated, functional exercises.     Total Treatment Billable Duration:  53 minutes   PT Patient Time In/Time Out  Time In: 0904  Time Out: [address], PT

## 2022-01-18 ENCOUNTER — HOSPITAL ENCOUNTER (OUTPATIENT)
Dept: PHYSICAL THERAPY | Age: 62
Discharge: HOME | End: 2022-01-18
Payer: OTHER GOVERNMENT

## 2022-01-18 NOTE — PROGRESS NOTES
Homa Nelson [facility] 1/18/2022      Patient's appointment cancelled due to weather.        Arminda Pittman, PT, DPT, OMT-C

## 2022-01-20 ENCOUNTER — HOSPITAL ENCOUNTER (OUTPATIENT)
Dept: PHYSICAL THERAPY | Age: 62
Discharge: HOME | End: 2022-01-20
Payer: OTHER GOVERNMENT

## 2022-01-20 PROCEDURE — 97140 MANUAL THERAPY 1/> REGIONS: CPT

## 2022-01-20 PROCEDURE — 97110 THERAPEUTIC EXERCISES: CPT

## 2022-01-20 NOTE — PROGRESS NOTES
Davy Conti  : 1960  Primary: [facility]  Secondary:  [address] at [facility]  [address] [place], [address], [place], [address] [place] [address]  [phone]   [phone]      OUTPATIENT PHYSICAL THERAPY: Daily Treatment Note 2022    ICD-10: Pain in right knee (M25.561)             Low back pain (M54.4)             Other abnormalities of gait and mobility (R26.89)   Precautions: pre-diabetic, migraines  Allergies: Amoxicillin and Penicillins   TREATMENT PLAN:  Effective Dates: 1/3/2022 TO 3/4/2022 (60 days). Frequency/Duration: 2 times a week for 60 Day(s) MEDICAL/REFERRING DIAGNOSIS:  Pain in right knee [M25.561]  Low back pain, unspecified [M54.50]   DATE OF ONSET: Patient reports her low back pain starting in November after lifting bags of mulch and working in her yard. REFERRING PHYSICIAN: Traci Cardenas MD MD Orders: Evaluate and Treat   Return MD Appointment: 3/15/2022 for lab work, no appointment for low back/ knee     Pre-treatment Symptoms/Complaints:  Patient reports minimal pain today. States therapy is helping. Pain: Initial: Pain Intensity 1: 1  Pain Location 1: Back  Pain Orientation 1: Lower  Post Session:  0/10   Medications Last Reviewed:  2022  Updated Objective Findings:  Improving mobility through spine and musculature   TREATMENT:   THERAPEUTIC EXERCISE: (30 minutes):  Exercises per grid below to improve mobility, strength, balance and coordination. Required moderate visual, verbal, manual and tactile cues to promote proper body alignment, promote proper body posture and promote proper body mechanics. Progressed resistance, range, repetitions and complexity of movement as indicated.      Date:  2022 Date:  22 Date:  2022   Activity/Exercise Parameters Parameters Parameters   LTR X 20 each 2 x 2 minutes X 20 each side   SKTC 3 x 30 seconds each X 5 each 4 x 30 seconds each   Hamstring stretch Strap, 3 x 30 seconds each Strap 4 x  0 seconds each Strap, 3 x 30 seconds each   Piriformis stretch 3 x 30 seconds each 4 x 0 seconds holding knee and ankle 3 x 30 seconds each   Pelvic tilts X 20 X 20  X 20   Transverse abdominus activation 10 x 10 seconds Several reps 10 x 10 seconds   TA hip abduction Green, 2 x 10 Blue 3 x 10 Green, x 10     Time spent with patient reviewing proper muscle recruitment and technique with exercises. Educated in stabilization for ADL'S     MANUAL THERAPY: (25 minutes): Joint mobilization, Soft tissue mobilization and Manipulation was utilized and necessary because of the patient's restricted joint motion, painful spasm, loss of articular motion and restricted motion of soft tissue   Soft tissue mobilization to lumbar region to decrease tightness and pain   Trigger point release to R piriformis to decrease tightness and spasms    Soft tissue mobilization to R lateral hip, gluteals, and piriformis to decrease pain and tightness   Prone posterior to anterior mobilizations of the thoracic and lumbar spine grades III-IV to improve mobility and decrease pain Not today    MODALITIES: (0 minutes):      none today     HEP: As above; handouts given to patient for all exercises. Treatment/Session Summary:    · Response to Treatment:  Decreased tightness after session. · Communication/Consultation:  None today  · Equipment provided today:  None today  · Recommendations/Intent for next treatment session: Next visit will focus on pain control, manual therapy and modalities as needed, progression of flexibility, core stability program as tolerated, functional exercises.     Total Treatment Billable Duration:  55 minutes   PT Patient Time In/Time Out  Time In: 1226  Time Out: [address] PTA

## 2022-01-24 ENCOUNTER — HOSPITAL ENCOUNTER (OUTPATIENT)
Dept: PHYSICAL THERAPY | Age: 62
Discharge: HOME | End: 2022-01-24
Payer: OTHER GOVERNMENT

## 2022-01-24 PROCEDURE — 97110 THERAPEUTIC EXERCISES: CPT

## 2022-01-24 PROCEDURE — 97140 MANUAL THERAPY 1/> REGIONS: CPT

## 2022-01-24 NOTE — PROGRESS NOTES
Castillo   : 1960  Primary: [facility]  Secondary:  [address] at [facility]  [address] [place], [address] [facility], [place], [address] [place] [address]  [phone]   [phone]      OUTPATIENT PHYSICAL THERAPY: Daily Treatment Note 2022    ICD-10: Pain in right knee (M25.561)             Low back pain (M54.4)             Other abnormalities of gait and mobility (R26.89)   Precautions: pre-diabetic, migraines  Allergies: Amoxicillin and Penicillins   TREATMENT PLAN:  Effective Dates: 1/3/2022 TO 3/4/2022 (60 days). Frequency/Duration: 2 times a week for 60 Day(s) MEDICAL/REFERRING DIAGNOSIS:  Pain in right knee [M25.561]  Low back pain, unspecified [M54.50]   DATE OF ONSET: Patient reports her low back pain starting in November after lifting bags of mulch and working in her yard. REFERRING PHYSICIAN: Jey Chacon MD MD Orders: Evaluate and Treat   Return MD Appointment: 3/15/2022 for lab work, no appointment for low back/ knee     Pre-treatment Symptoms/Complaints:  Patient reports some soreness in the center of her lower back this morning. States she is feeling much better since starting therapy. Pain: Initial: Pain Intensity 1: 1  Pain Location 1: Back  Pain Orientation 1: Lower  Post Session:  0/10   Medications Last Reviewed:  2022  Updated Objective Findings:  See Progress Note from today   TREATMENT:   THERAPEUTIC EXERCISE: (24 minutes):  Exercises per grid below to improve mobility, strength, balance and coordination. Required moderate visual, verbal, manual and tactile cues to promote proper body alignment, promote proper body posture and promote proper body mechanics. Progressed resistance, range, repetitions and complexity of movement as indicated.      Date:  2022 Date:  22 Date:  2022   Activity/Exercise Parameters Parameters Parameters   LTR X 20 each 2 x 2 minutes X 20 each side   SKTC 3 x 30 seconds each X 5 each --- Hamstring stretch Strap, 3 x 30 seconds each Strap 4 x 30 seconds each Strap, 3 x 30 seconds each   Piriformis stretch 3 x 30 seconds each 4 x 30 seconds holding knee and ankle 3 x 30 seconds each   Pelvic tilts X 20 X 20  X 20   Transverse abdominus activation 10 x 10 seconds Several reps 10 x 10 seconds   TA hip abduction Green, 2 x 10 Blue 3 x 10 Blue, 3 x 10   TA marching --- --- 2 x 10           Time spent with patient reviewing proper muscle recruitment and technique with exercises. MANUAL THERAPY: (30 minutes): Joint mobilization, Soft tissue mobilization and Manipulation was utilized and necessary because of the patient's restricted joint motion, painful spasm, loss of articular motion and restricted motion of soft tissue   Soft tissue mobilization to lumbar region to decrease tightness and pain   Trigger point release to R piriformis to decrease tightness and spasms    Soft tissue mobilization to R lateral hip, gluteals, and piriformis to decrease pain and tightness   Prone posterior to anterior mobilizations of the thoracic and lumbar spine grades III-IV to improve mobility and decrease pain Not today    MODALITIES: (0 minutes):      none today     HEP: As above; handouts given to patient for all exercises. Treatment/Session Summary:    · Response to Treatment:  Decreased pain and tightness at end of session. Plan to progress core stability exercises per tolerance. Changing to one time a week   · Communication/Consultation:  None today  · Equipment provided today:  None today  · Recommendations/Intent for next treatment session: Next visit will focus on pain control, manual therapy and modalities as needed, progression of flexibility, core stability program as tolerated, functional exercises.     Total Treatment Billable Duration:  54 minutes   PT Patient Time In/Time Out  Time In: 1105  Time Out: Leslie Ortiz 300, PT

## 2022-01-24 NOTE — PROGRESS NOTES
Jose Vaz  : 1960  Primary: [facility]  Secondary:  [address] at [facility]  [address] [place], [address] [place] [address], [place], [address] [place] [address]  [phone]   [phone]       OUTPATIENT PHYSICAL THERAPY:Progress Report 2022    ICD-10: Pain in right knee (M25.561)             Low back pain (M54.4)             Other abnormalities of gait and mobility (R26.89)   Precautions: pre-diabetic, migraines  Allergies: Amoxicillin and Penicillins   TREATMENT PLAN:  Effective Dates: 1/3/2022 TO 3/4/2022 (60 days). Frequency/Duration: 2 times a week for 60 Day(s) MEDICAL/REFERRING DIAGNOSIS:  Pain in right knee [M25.561]  Low back pain, unspecified [M54.50]   DATE OF ONSET: Patient reports her low back pain starting in November after lifting bags of mulch and working in her yard. REFERRING PHYSICIAN: Talisha Choi MD MD Orders: Evaluate and Treat   Return MD Appointment: 3/15/2022 for lab work, no appointment for low back/ knee     INITIAL ASSESSMENT:  Ms. Emilie Acuna is a 62 y.o. female presenting to physical therapy with complaints of  low back and R knee pain which began in November after mulching her yard. She reports her  having a back problem and having to do the heavy lifting at home which she feels contributed to her back pain. She has taken muscle relaxants with minimal change. X-ray indicative of mild osteoarthritis in her lumbar spine and R knee per chart review. Patient does report feeling better now than she did when it first started. Reports less R LE symptoms, but continues to have a constant ache in her lower back, intermittent R anterior knee pain, and occasional R LE symptoms. She is eager to reduce her symptoms and return to her prior activity level including yoga.  Patient presents with increased pain, decreased strength, decreased ROM, decreased flexibility, impaired gait, impaired transfer ability, decreased activity tolerance, and overall impaired functional mobility. Patient is a good candidate for skilled physical therapy interventions to include manual therapy, therapeutic exercise, balance training, gait training, transfer training, postural re-education, body mechanics training, and pain modalities as needed. PROGRESS NOTE 1/24/2022: Patient has been seen for 6 sessions of physical therapy from 1/3/2022 to 1/24/2022. She reports feeling about 90% better since starting therapy with no complaints of R knee pain, intermittent low back pain instead of constant pain, ane being able to lift some heavier items lately that she wouldn't have tried before. She needs to continue working on core stability and mobility. She has met some of her goals and is progressing overall. She would benefit from continued skilled therapy in order to progress back to prior level of function and activities. PROBLEM LIST (Impacting functional limitations):  1. Decreased Strength  2. Decreased ADL/Functional Activities  3. Decreased Transfer Abilities  4. Decreased Ambulation Ability/Technique  5. Decreased Balance  6. Increased Pain  7. Decreased Activity Tolerance  8. Decreased Pacing Skills  9. Decreased Work Simplification/Energy Conservation Techniques  10. Decreased Flexibility/Joint Mobility INTERVENTIONS PLANNED: (Treatment may consist of any combination of the following)  1. Balance Exercise  2. Bed Mobility  3. Cold  4. Cryotherapy  5. Electrical Stimulation  6. Family Education  7. Gait Training  8. Heat  9. Home Exercise Program (HEP)  10. Manual Therapy  11. Neuromuscular Re-education/Strengthening  12. Range of Motion (ROM)  13. Therapeutic Activites  14. Therapeutic Exercise/Strengthening  15. Transfer Training  16. Ultrasound (US)  17. Trigger Point Dry Needling     GOALS: (Goals have been discussed and agreed upon with patient.)  Short-Term Functional Goals: Time Frame: 1/3/2022 to 2/3/2022  1.  Patient demonstrates independence with home exercise program without verbal cueing provided by therapist. -GOAL MET  2. Patient will report no more than 1/10 low back pain at rest in order to demonstrate improved self pain control and tolerance. -GOAL MET  3. Patient will be educated in and demonstrate proper squat lift technique in order to reduce stress on bilateral LE and lumbar spine, improve safety, and reduce risk of injury. -GOAL MET   4. Patient will report no R knee buckling since starting therapy in order to demonstrate improving stability and functional mobility. -GOAL  MET  Discharge Goals: Time Frame: 1/3/2022 to 3/4/2022  1. Patient will report minimal to no low back or R knee pain with car transfers, sit to stand, and bed mobility in order to improve quality of life. -GOAL MET  2. Patient will be able to perform stairs with reciprocal pattern up and down with minimal R knee pain, even with carrying light weight, in order to improve negotiation around her home with groceries. -ONGOING  3. Patient will report no R LE symptoms associated with sciatic pain in order to demonstrate improved centralization of initial symptoms. -GOAL MET  4. Patient will improve Modified Oswestry Scale score to less than 10/50. -GOAL MET  5. Patient will improve Lower Extremity Functional Scale score to 57/80 from 49/80. -GOAL MET    Outcome Measure: Tool Used: Lower Extremity Functional Scale (LEFS)  Score:  Initial: 49/80 Most Recent: 58/80 (Date: 1/24/2022 )   Interpretation of Score: 20 questions each scored on a 5 point scale with 0 representing \"extreme difficulty or unable to perform\" and 4 representing \"no difficulty\". The lower the score, the greater the functional disability. 80/80 represents no disability. Minimal detectable change is 9 points.     Tool Used: Modified Oswestry Low Back Pain Questionnaire  Score:  Initial: 10/50  Most Recent: 2/50 (Date: 1/24/2022 )   Interpretation of Score: Each section is scored on a 0-5 scale, 5 representing the greatest disability. The scores of each section are added together for a total score of 50. UPDATED OBJECTIVE FINDINGS:    Observation/Orthostatic Postural Assessment:          Patient with slightly widened base of support, decreased weight shift to R LE, slight R knee flexion in stance, decreased lumbar lordosis, slightly increased thoracic kyphosis, mild thoracic scoliosis. Palpation:          Moderate (from moderate to significant) tenderness to palpation of upper lumbar spine, mild to lower lumbar. Moderate (from significant) tenderness to R piriformis with moderate tightness noted. Minimal to moderate tenderness and tightness R IT band. ROM:    AROM (degrees)   Lumbar Flexion 60   Lumbar Extension 10     AROM/ PROM Left (degrees) Right (degrees)   Knee Flexion 145 145   Knee Extension 3 2     Strength: Motion Tested Left   (*/5) Right  (*/5)   Hip Flexion 5 5   Hip Extension 4+ 4   Hip Abduction 4+ 4   Knee Flexion 4+ 4+   Knee Extension 5 4+   Ankle Dorsiflexion 5 5   Gross core strength 3/5 as observed with transfers. Passive Accessory Motion:         Moderate (from significant) limitation with posterior to anterior mobilizations of the lumbar spine and sacrum. Functional Mobility:         Gait/Ambulation: slow spenser with mild antalgia, decreased stance time R LE, increased lateral sway. Transfers:  Minimal (from minimal to moderate) use of bilateral UE for sit to stand transfers        Stairs:  Reports ascending stairs one at a time, especially when carrying groceries or other items. Reciprocal pattern descending stairs. Patient reports less to no pain with initial walking, car transfers, and sometimes with bed mobility. Balance:          Sitting balance intact.  Standing balance mildly limited      Medical Necessity:   · Patient is expected to demonstrate progress in strength, range of motion, balance, coordination and functional technique to increase independence with transfers, lifting, and stairs and improve safety during lifting, reaching, bending, stairs, and transfers. · Skilled intervention continues to be required due to constant low back pain with intermittent increases of R knee pain hindering functional mobility and quality of life. Reason for Services/Other Comments:  · Patient continues to require skilled intervention due to increased low back and R knee pain impacting functional mobility and return to prior level of function and independence. .    Rehabilitation Potential For Stated Goals: Good  Regarding Sheba Buster Szymanskihens's therapy, I certify that the treatment plan above will be carried out by a therapist or under their direction. Thank you for this referral,  Karena Grewal PT     Referring Physician Signature: Ghassan Thrasher MD No Signature is Required for this note.

## 2022-01-27 ENCOUNTER — HOSPITAL ENCOUNTER (OUTPATIENT)
Dept: PHYSICAL THERAPY | Age: 62
Discharge: HOME | End: 2022-01-27
Payer: OTHER GOVERNMENT

## 2022-02-01 ENCOUNTER — HOSPITAL ENCOUNTER (OUTPATIENT)
Dept: PHYSICAL THERAPY | Age: 62
Discharge: HOME | End: 2022-02-01
Payer: OTHER GOVERNMENT

## 2022-02-01 PROCEDURE — 97110 THERAPEUTIC EXERCISES: CPT

## 2022-02-01 PROCEDURE — 97140 MANUAL THERAPY 1/> REGIONS: CPT

## 2022-02-01 NOTE — PROGRESS NOTES
Victorino Botello  : 1960  Primary: [facility]  Secondary:   [address] at [facility]  [address] [place], [address] [place] [address] Avenue, Iván villasenor, [address] [place] [address]  [phone]   [phone]      OUTPATIENT PHYSICAL THERAPY: Daily Treatment Note 2022    ICD-10: Pain in right knee (M25.561)             Low back pain (M54.4)             Other abnormalities of gait and mobility (R26.89)   Precautions: pre-diabetic, migraines  Allergies: Amoxicillin and Penicillins   TREATMENT PLAN:  Effective Dates: 1/3/2022 TO 3/4/2022 (60 days). Frequency/Duration: 2 times a week for 60 Day(s) MEDICAL/REFERRING DIAGNOSIS:  Pain in right knee [M25.561]  Low back pain, unspecified [M54.50]   DATE OF ONSET: Patient reports her low back pain starting in November after lifting bags of mulch and working in her yard. REFERRING PHYSICIAN: Noris Zhu MD MD Orders: Evaluate and Treat   Return MD Appointment: 3/15/2022 for lab work, no appointment for low back/ knee     Pre-treatment Symptoms/Complaints:  Patient reports some increased low back soreness from moving some boxes and going up and down stairs helping her daughter. Pain: Initial: Pain Intensity 1: 2  Pain Location 1: Back  Pain Orientation 1: Lower  Post Session:  0/10   Medications Last Reviewed:  2022  Updated Objective Findings:  trigger point and tightness through R gluteal and piriformis   TREATMENT:   THERAPEUTIC EXERCISE: (24 minutes):  Exercises per grid below to improve mobility, strength, balance and coordination. Required moderate visual, verbal, manual and tactile cues to promote proper body alignment, promote proper body posture and promote proper body mechanics. Progressed resistance, range, repetitions and complexity of movement as indicated.      Date:  2022 Date:  22 Date:  2022   Activity/Exercise Parameters Parameters Parameters   LTR X 20 each    Stability ball 2 x 10 2 x 2 minutes X 20 each side SKTC --- X 5 each ---   Hamstring stretch Strap, 3 x 30 seconds each Strap 4 x 30 seconds each Strap, 3 x 30 seconds each   Piriformis stretch 3 x 30 seconds each 4 x 30 seconds holding knee and ankle 3 x 30 seconds each   Pelvic tilts X 20 X 20  X 20   Transverse abdominus activation 10 x 10 seconds Several reps 10 x 10 seconds   TA hip abduction Blue, 3 x 10 Blue 3 x 10 Blue, 3 x 10   TA marching --- --- 2 x 10   Stability ball knee to chest 2 x 10       Time spent with patient reviewing proper muscle recruitment and technique with exercises. MANUAL THERAPY: (30 minutes): Joint mobilization, Soft tissue mobilization and Manipulation was utilized and necessary because of the patient's restricted joint motion, painful spasm, loss of articular motion and restricted motion of soft tissue   Soft tissue mobilization to lumbar region to decrease tightness and pain   Trigger point release to R piriformis to decrease tightness and spasms    Soft tissue mobilization to R lateral hip, gluteals, and piriformis to decrease pain and tightness   Prone posterior to anterior mobilizations of the thoracic and lumbar spine grades III-IV to improve mobility and decrease pain Not today    MODALITIES: (0 minutes):      none today     HEP: As above; handouts given to patient for all exercises. Treatment/Session Summary:    · Response to Treatment:  Patient with decreased pain and tightness following manual therapy. Improved mobility and transfers at end of session. · Communication/Consultation:  None today  · Equipment provided today:  None today  · Recommendations/Intent for next treatment session: Next visit will focus on pain control, manual therapy and modalities as needed, progression of flexibility, core stability program as tolerated, functional exercises.     Total Treatment Billable Duration:  54 minutes   PT Patient Time In/Time Out  Time In: 1333  Time Out: [address], PT

## 2022-02-03 ENCOUNTER — APPOINTMENT (OUTPATIENT)
Dept: PHYSICAL THERAPY | Age: 62
End: 2022-02-03
Payer: OTHER GOVERNMENT

## 2022-02-08 ENCOUNTER — HOSPITAL ENCOUNTER (OUTPATIENT)
Dept: PHYSICAL THERAPY | Age: 62
Discharge: HOME | End: 2022-02-08
Payer: OTHER GOVERNMENT

## 2022-02-08 PROCEDURE — 97110 THERAPEUTIC EXERCISES: CPT

## 2022-02-08 PROCEDURE — 97140 MANUAL THERAPY 1/> REGIONS: CPT

## 2022-02-08 NOTE — PROGRESS NOTES
Tari Caro  : 1960  Primary: [facility]  Secondary:  [address] at [facility]  [address] [place], [address] [facility], [place], [address] [place] [address]  [phone]   [phone]      OUTPATIENT PHYSICAL THERAPY: Daily Treatment Note 2022    ICD-10: Pain in right knee (M25.561)             Low back pain (M54.4)             Other abnormalities of gait and mobility (R26.89)   Precautions: pre-diabetic, migraines  Allergies: Amoxicillin and Penicillins   TREATMENT PLAN:  Effective Dates: 1/3/2022 TO 3/4/2022 (60 days). Frequency/Duration: 2 times a week for 60 Day(s) MEDICAL/REFERRING DIAGNOSIS:  Pain in right knee [M25.561]  Low back pain, unspecified [M54.50]   DATE OF ONSET: Patient reports her low back pain starting in November after lifting bags of mulch and working in her yard. REFERRING PHYSICIAN: Bakari Renee MD MD Orders: Evaluate and Treat   Return MD Appointment: 3/15/2022 for lab work, no appointment for low back/ knee     Pre-treatment Symptoms/Complaints:  Patient with no complaints of pain this afternoon. Pain: Initial: Pain Intensity 1: 0  Pain Location 1: Back  Pain Orientation 1: Lower  Post Session:  0/10   Medications Last Reviewed:  2022  Updated Objective Findings:  Decreased tightness with spinal mobilizations and soft tissue work   TREATMENT:   THERAPEUTIC EXERCISE: (28 minutes):  Exercises per grid below to improve mobility, strength, balance and coordination. Required moderate visual, verbal, manual and tactile cues to promote proper body alignment, promote proper body posture and promote proper body mechanics. Progressed resistance, range, repetitions and complexity of movement as indicated.      Date:  2022 Date:  2022 Date:  2022   Activity/Exercise Parameters Parameters Parameters   LTR X 20 each    Stability ball 2 x 10 2 x 20 each side X 20 each side   Hamstring stretch Strap, 3 x 30 seconds each Strap 4 x 30 seconds each Strap, 3 x 30 seconds each   Piriformis stretch 3 x 30 seconds each 4 x 30 seconds each 3 x 30 seconds each   Pelvic tilts X 20 X 20  X 20   Transverse abdominus activation 10 x 10 seconds 15 x 10 seconds 10 x 10 seconds   TA hip abduction Blue, 3 x 10 Blue 3 x 15 Blue, 3 x 10   TA marching --- 2 x 10 2 x 10   Stability ball knee to chest 2 x 10 ---    Bridging --- Green abduction, 2 x 10    Clamshells --- Yellow, 2 x 10    Sit to stand --- 2 x 10      Time spent with patient reviewing proper muscle recruitment and technique with exercises. MANUAL THERAPY: (25 minutes): Joint mobilization, Soft tissue mobilization and Manipulation was utilized and necessary because of the patient's restricted joint motion, painful spasm, loss of articular motion and restricted motion of soft tissue   Soft tissue mobilization to lumbar region to decrease tightness and pain   Trigger point release to R piriformis to decrease tightness and spasms    Soft tissue mobilization to R lateral hip, gluteals, and piriformis to decrease pain and tightness   Prone posterior to anterior mobilizations of the thoracic and lumbar spine grades III-IV to improve mobility and decrease pain     MODALITIES: (0 minutes):      none today     HEP: As above; handouts given to patient for all exercises. Treatment/Session Summary:    · Response to Treatment:  Patient with no complaints of pain with exercises today. Good tolerance for core stability work. · Communication/Consultation:  None today  · Equipment provided today:  None today  · Recommendations/Intent for next treatment session: Next visit will focus on pain control, manual therapy and modalities as needed, progression of flexibility, core stability program as tolerated, functional exercises.     Total Treatment Billable Duration:  53 minutes   PT Patient Time In/Time Out  Time In: 6722  Time Out: Kaylee 53, PT

## 2022-02-10 ENCOUNTER — APPOINTMENT (OUTPATIENT)
Dept: PHYSICAL THERAPY | Age: 62
End: 2022-02-10
Payer: OTHER GOVERNMENT

## 2022-02-15 ENCOUNTER — HOSPITAL ENCOUNTER (OUTPATIENT)
Dept: PHYSICAL THERAPY | Age: 62
Discharge: HOME | End: 2022-02-15
Payer: OTHER GOVERNMENT

## 2022-02-15 ENCOUNTER — ANESTHESIA EVENT (OUTPATIENT)
Dept: SURGERY | Age: 62
End: 2022-02-15
Payer: OTHER GOVERNMENT

## 2022-02-15 PROCEDURE — 97110 THERAPEUTIC EXERCISES: CPT

## 2022-02-15 PROCEDURE — 97140 MANUAL THERAPY 1/> REGIONS: CPT

## 2022-02-15 RX ORDER — NALOXONE HYDROCHLORIDE 0.4 MG/ML
0.1 INJECTION, SOLUTION INTRAMUSCULAR; INTRAVENOUS; SUBCUTANEOUS AS NEEDED
Status: CANCELLED | OUTPATIENT
Start: 2022-02-15

## 2022-02-15 RX ORDER — OXYCODONE HYDROCHLORIDE 5 MG/1
5 TABLET ORAL
Status: CANCELLED | OUTPATIENT
Start: 2022-02-15 | End: 2022-02-16

## 2022-02-15 RX ORDER — FLUMAZENIL 0.1 MG/ML
0.2 INJECTION INTRAVENOUS
Status: CANCELLED | OUTPATIENT
Start: 2022-02-15

## 2022-02-15 RX ORDER — HYDROMORPHONE HYDROCHLORIDE 2 MG/ML
0.5 INJECTION, SOLUTION INTRAMUSCULAR; INTRAVENOUS; SUBCUTANEOUS
Status: CANCELLED | OUTPATIENT
Start: 2022-02-15

## 2022-02-15 RX ORDER — SODIUM CHLORIDE, SODIUM LACTATE, POTASSIUM CHLORIDE, CALCIUM CHLORIDE 600; 310; 30; 20 MG/100ML; MG/100ML; MG/100ML; MG/100ML
75 INJECTION, SOLUTION INTRAVENOUS CONTINUOUS
Status: CANCELLED | OUTPATIENT
Start: 2022-02-15

## 2022-02-15 RX ORDER — DIPHENHYDRAMINE HYDROCHLORIDE 50 MG/ML
12.5 INJECTION, SOLUTION INTRAMUSCULAR; INTRAVENOUS
Status: CANCELLED | OUTPATIENT
Start: 2022-02-15

## 2022-02-15 NOTE — PROGRESS NOTES
Abi Appiah  : 1960  Primary: [facility]  Secondary:  [address] at [facility]  [address] [place], [address] [place] [address], [place], [address] [place] [address]  [phone]   [phone]      OUTPATIENT PHYSICAL THERAPY: Daily Treatment Note 2/15/2022    ICD-10: Pain in right knee (M25.561)             Low back pain (M54.4)             Other abnormalities of gait and mobility (R26.89)   Precautions: pre-diabetic, migraines  Allergies: Amoxicillin and Penicillins   TREATMENT PLAN:  Effective Dates: 1/3/2022 TO 3/4/2022 (60 days). Frequency/Duration: 2 times a week for 60 Day(s) MEDICAL/REFERRING DIAGNOSIS:  Pain in right knee [M25.561]  Low back pain, unspecified [M54.50]   DATE OF ONSET: Patient reports her low back pain starting in November after lifting bags of mulch and working in her yard. REFERRING PHYSICIAN: Alan Singer MD MD Orders: Evaluate and Treat   Return MD Appointment: 3/15/2022 for lab work, no appointment for low back/ knee     Pre-treatment Symptoms/Complaints:  Patient reports no pain today just a little tightness    Pain: Initial: Pain Intensity 1: 0  Pain Location 1: Back  Pain Orientation 1: Lower,Right  Post Session:  0/10   Medications Last Reviewed:  2/15/2022  Updated Objective Findings:  None Today   TREATMENT:   THERAPEUTIC EXERCISE: (30 minutes):  Exercises per grid below to improve mobility, strength, balance and coordination. Required moderate visual, verbal, manual and tactile cues to promote proper body alignment, promote proper body posture and promote proper body mechanics. Progressed resistance, range, repetitions and complexity of movement as indicated.      Date:  2022 Date:  2022 Date:  2/15/22   Activity/Exercise Parameters Parameters Parameters   LTR X 20 each    Stability ball 2 x 10 2 x 20 each side 2 X 20 each side   Hamstring stretch Strap, 3 x 30 seconds each Strap 4 x 30 seconds each Strap, 5 x 30 seconds each Piriformis stretch 3 x 30 seconds each 4 x 30 seconds each 3 x 30 seconds each   Pelvic tilts X 20 X 20  2 X 20   Transverse abdominus activation 10 x 10 seconds 15 x 10 seconds 10 x 10 seconds   TA hip abduction Blue, 3 x 10 Blue 3 x 15 Blue, 3 x 10   TA marching --- 2 x 10 Green 2 x 10   Stability ball knee to chest 2 x 10 ---    Bridging --- Green abduction, 2 x 10 Green  3 x 10   Clamshells --- Yellow, 2 x 10  3 x 10   Sit to stand --- 2 x 10      Time spent with patient reviewing proper muscle recruitment and technique with exercises. MANUAL THERAPY: (15 minutes): Joint mobilization, Soft tissue mobilization and Manipulation was utilized and necessary because of the patient's restricted joint motion, painful spasm, loss of articular motion and restricted motion of soft tissue   Soft tissue mobilization to lumbar region to decrease tightness and pain   Trigger point release to R piriformis to decrease tightness and spasms    Soft tissue mobilization to R lateral hip, gluteals, and piriformis to decrease pain and tightness   Prone posterior to anterior mobilizations of the thoracic and lumbar spine grades III-IV to improve mobility and decrease pain     MODALITIES: (0 minutes):      none today     HEP: As above; handouts given to patient for all exercises. Treatment/Session Summary:    · Response to Treatment:  Decreased tightness right low back   · Communication/Consultation:  None today  · Equipment provided today:  None today  · Recommendations/Intent for next treatment session: Next visit will focus on pain control, manual therapy and modalities as needed, progression of flexibility, core stability program as tolerated, functional exercises.     Total Treatment Billable Duration:  53 minutes   PT Patient Time In/Time Out  Time In: 1000  Time Out: [address] [facility]

## 2022-02-16 ENCOUNTER — ANESTHESIA (OUTPATIENT)
Dept: SURGERY | Age: 62
End: 2022-02-16
Payer: OTHER GOVERNMENT

## 2022-02-16 ENCOUNTER — HOSPITAL ENCOUNTER (OUTPATIENT)
Age: 62
Setting detail: OUTPATIENT SURGERY
Discharge: HOME | End: 2022-02-16
Attending: ORTHOPAEDIC SURGERY | Admitting: ORTHOPAEDIC SURGERY
Payer: OTHER GOVERNMENT

## 2022-02-16 VITALS
HEART RATE: 57 BPM | WEIGHT: 133 LBS | SYSTOLIC BLOOD PRESSURE: 115 MMHG | DIASTOLIC BLOOD PRESSURE: 74 MMHG | RESPIRATION RATE: 16 BRPM | TEMPERATURE: 98.2 F | OXYGEN SATURATION: 98 % | BODY MASS INDEX: 24.33 KG/M2

## 2022-02-16 DIAGNOSIS — M65.331 ACQUIRED TRIGGER FINGER OF RIGHT MIDDLE FINGER: Primary | ICD-10-CM

## 2022-02-16 LAB
GLUCOSE BLD STRIP.AUTO-MCNC: 102 MG/DL (ref 65–100)
SERVICE CMNT-IMP: ABNORMAL

## 2022-02-16 PROCEDURE — 77030002916 HC SUT ETHLN J&J -A: Performed by: ORTHOPAEDIC SURGERY

## 2022-02-16 PROCEDURE — 74011000250 HC RX REV CODE- 250: Performed by: ORTHOPAEDIC SURGERY

## 2022-02-16 PROCEDURE — 74011250636 HC RX REV CODE- 250/636: Performed by: NURSE ANESTHETIST, CERTIFIED REGISTERED

## 2022-02-16 PROCEDURE — 76210000020 HC REC RM PH II FIRST 0.5 HR: Performed by: ORTHOPAEDIC SURGERY

## 2022-02-16 PROCEDURE — 82962 GLUCOSE BLOOD TEST: CPT

## 2022-02-16 PROCEDURE — 2709999900 HC NON-CHARGEABLE SUPPLY: Performed by: ORTHOPAEDIC SURGERY

## 2022-02-16 PROCEDURE — 76010000154 HC OR TIME FIRST 0.5 HR: Performed by: ORTHOPAEDIC SURGERY

## 2022-02-16 PROCEDURE — 26055 INCISE FINGER TENDON SHEATH: CPT | Performed by: ORTHOPAEDIC SURGERY

## 2022-02-16 PROCEDURE — 74011250636 HC RX REV CODE- 250/636: Performed by: ORTHOPAEDIC SURGERY

## 2022-02-16 PROCEDURE — 76060000031 HC ANESTHESIA FIRST 0.5 HR: Performed by: ORTHOPAEDIC SURGERY

## 2022-02-16 PROCEDURE — 74011250636 HC RX REV CODE- 250/636: Performed by: ANESTHESIOLOGY

## 2022-02-16 PROCEDURE — 76210000063 HC OR PH I REC FIRST 0.5 HR: Performed by: ORTHOPAEDIC SURGERY

## 2022-02-16 PROCEDURE — 77030000032 HC CUF TRNQT ZIMM -B: Performed by: ORTHOPAEDIC SURGERY

## 2022-02-16 PROCEDURE — 74011250637 HC RX REV CODE- 250/637: Performed by: ANESTHESIOLOGY

## 2022-02-16 PROCEDURE — 74011000250 HC RX REV CODE- 250: Performed by: NURSE ANESTHETIST, CERTIFIED REGISTERED

## 2022-02-16 RX ORDER — SODIUM CHLORIDE 0.9 % (FLUSH) 0.9 %
5-40 SYRINGE (ML) INJECTION EVERY 8 HOURS
Status: DISCONTINUED | OUTPATIENT
Start: 2022-02-16 | End: 2022-02-16 | Stop reason: HOSPADM

## 2022-02-16 RX ORDER — PROPOFOL 10 MG/ML
INJECTION, EMULSION INTRAVENOUS AS NEEDED
Status: DISCONTINUED | OUTPATIENT
Start: 2022-02-16 | End: 2022-02-16 | Stop reason: HOSPADM

## 2022-02-16 RX ORDER — LIDOCAINE HYDROCHLORIDE 20 MG/ML
INJECTION, SOLUTION EPIDURAL; INFILTRATION; INTRACAUDAL; PERINEURAL AS NEEDED
Status: DISCONTINUED | OUTPATIENT
Start: 2022-02-16 | End: 2022-02-16 | Stop reason: HOSPADM

## 2022-02-16 RX ORDER — BUPIVACAINE HYDROCHLORIDE 2.5 MG/ML
INJECTION, SOLUTION EPIDURAL; INFILTRATION; INTRACAUDAL AS NEEDED
Status: DISCONTINUED | OUTPATIENT
Start: 2022-02-16 | End: 2022-02-16 | Stop reason: HOSPADM

## 2022-02-16 RX ORDER — MIDAZOLAM HYDROCHLORIDE 1 MG/ML
2 INJECTION, SOLUTION INTRAMUSCULAR; INTRAVENOUS ONCE
Status: COMPLETED | OUTPATIENT
Start: 2022-02-16 | End: 2022-02-16

## 2022-02-16 RX ORDER — CEFAZOLIN SODIUM/WATER 2 G/20 ML
2 SYRINGE (ML) INTRAVENOUS ONCE
Status: COMPLETED | OUTPATIENT
Start: 2022-02-16 | End: 2022-02-16

## 2022-02-16 RX ORDER — ACETAMINOPHEN 500 MG
1000 TABLET ORAL ONCE
Status: COMPLETED | OUTPATIENT
Start: 2022-02-16 | End: 2022-02-16

## 2022-02-16 RX ORDER — LIDOCAINE HYDROCHLORIDE 10 MG/ML
INJECTION INFILTRATION; PERINEURAL AS NEEDED
Status: DISCONTINUED | OUTPATIENT
Start: 2022-02-16 | End: 2022-02-16 | Stop reason: HOSPADM

## 2022-02-16 RX ORDER — FENTANYL CITRATE 50 UG/ML
100 INJECTION, SOLUTION INTRAMUSCULAR; INTRAVENOUS AS NEEDED
Status: DISCONTINUED | OUTPATIENT
Start: 2022-02-16 | End: 2022-02-16 | Stop reason: HOSPADM

## 2022-02-16 RX ORDER — SODIUM CHLORIDE 0.9 % (FLUSH) 0.9 %
5-40 SYRINGE (ML) INJECTION AS NEEDED
Status: DISCONTINUED | OUTPATIENT
Start: 2022-02-16 | End: 2022-02-16 | Stop reason: HOSPADM

## 2022-02-16 RX ORDER — LIDOCAINE HYDROCHLORIDE 10 MG/ML
0.1 INJECTION INFILTRATION; PERINEURAL AS NEEDED
Status: DISCONTINUED | OUTPATIENT
Start: 2022-02-16 | End: 2022-02-16 | Stop reason: HOSPADM

## 2022-02-16 RX ORDER — HYDROCODONE BITARTRATE AND ACETAMINOPHEN 5; 325 MG/1; MG/1
1 TABLET ORAL
Qty: 10 TABLET | Refills: 0 | Status: SHIPPED | OUTPATIENT
Start: 2022-02-16 | End: 2022-02-19

## 2022-02-16 RX ORDER — SODIUM CHLORIDE, SODIUM LACTATE, POTASSIUM CHLORIDE, CALCIUM CHLORIDE 600; 310; 30; 20 MG/100ML; MG/100ML; MG/100ML; MG/100ML
100 INJECTION, SOLUTION INTRAVENOUS CONTINUOUS
Status: DISCONTINUED | OUTPATIENT
Start: 2022-02-16 | End: 2022-02-16 | Stop reason: HOSPADM

## 2022-02-16 RX ADMIN — SODIUM CHLORIDE, SODIUM LACTATE, POTASSIUM CHLORIDE, AND CALCIUM CHLORIDE 100 ML/HR: 600; 310; 30; 20 INJECTION, SOLUTION INTRAVENOUS at 09:34

## 2022-02-16 RX ADMIN — Medication 2 G: at 10:12

## 2022-02-16 RX ADMIN — LIDOCAINE HYDROCHLORIDE 20 MG: 20 INJECTION, SOLUTION EPIDURAL; INFILTRATION; INTRACAUDAL; PERINEURAL at 10:20

## 2022-02-16 RX ADMIN — PROPOFOL 40 MG: 10 INJECTION, EMULSION INTRAVENOUS at 10:20

## 2022-02-16 RX ADMIN — MIDAZOLAM 2 MG: 1 INJECTION INTRAMUSCULAR; INTRAVENOUS at 10:09

## 2022-02-16 RX ADMIN — ACETAMINOPHEN 1000 MG: 500 TABLET, FILM COATED ORAL at 09:34

## 2022-02-16 NOTE — ANESTHESIA PREPROCEDURE EVALUATION
Relevant Problems   No relevant active problems       Anesthetic History   No history of anesthetic complications            Review of Systems / Medical History  Patient summary reviewed and pertinent labs reviewed    Pulmonary            Asthma (exercise-induced ) : well controlled       Neuro/Psych         Headaches and psychiatric history     Cardiovascular    Hypertension: well controlled          Hyperlipidemia    Exercise tolerance: >4 METS     GI/Hepatic/Renal           Liver disease (fatty liver)     Endo/Other    Diabetes (prediabetic on metformin ): well controlled  Hypothyroidism  Arthritis     Other Findings              Physical Exam    Airway  Mallampati: I  TM Distance: 4 - 6 cm  Neck ROM: normal range of motion   Mouth opening: Normal     Cardiovascular  Regular rate and rhythm,  S1 and S2 normal,  no murmur, click, rub, or gallop  Rhythm: regular  Rate: normal         Dental    Dentition: Implants     Pulmonary  Breath sounds clear to auscultation               Abdominal  GI exam deferred       Other Findings            Anesthetic Plan    ASA: 2  Anesthesia type: total IV anesthesia          Induction: Intravenous  Anesthetic plan and risks discussed with: Patient      Local per surgeon

## 2022-02-16 NOTE — DISCHARGE INSTRUCTIONS
Hand Surgery Postoperative  Instructions:      Weightbearing or Lifting:  Limit  weight  lifting  to  less  than  1  pound  (coffee  mug)  for  the  first  2  weeks  after  surgery. Dressing  instructions:    Keep  your  dressing  and/or  splint  clean  and  dry  at  all  times. You  can  remove  your  dressing  on  post-operative  day  #7  and  change  with  a  dry/sterile  dressing  or  Band-Aids  as  needed  thereafter. Showering  Instructions:  May  shower  But keep surgical dressing clean and dry until removed as explained above. After dressing is removed, you may allow soapy water to run through the incision during showers but do not scrub. After each shower, pat dry and apply a dry dressing. Do  not  soak  your  Incision in still water or bathtub  for  3  weeks  after  surgery. If  the  incision  gets  wet otherwise,  pat  dry  and  do  not  scrub  the  incision. Do  not  apply  cream  or  lotion  to  incision      Pain  Control:  - You  have  been  given  a  prescription  to  be  taken  as  directed  for  post-operative  pain  control. In  addition,  elevate  the  operative  extremity  above  the  heart  at  all  times  to  prevent  swelling  and  throbbing  pain. - If you develop constipation while taking narcotic pain medications (Norco, Hydrocodone, Percocet, Oxycodone, Dilaudid, Hydromorphone) take  over-the-counter  Colace,  100mg  by  mouth  twice  a  Day. - Nausea  is  a  common  side  effect  of  many  pain  medications. You  will  want  to  eat something  before  taking  your  pain  medicine  to  help  prevent  Nausea. - If  you  are  taking  a  prescription  pain  medication  that  contains  acetaminophen,  we  recommend  that  you  do  not  take  additional  over  the  counter  acetaminophen  (Tylenol®).       Other  pain  relieving  options:   - Using  a  cold  pack  to  ice  the  affected  area  a  few  times  a  day  (15  to  20  minutes  at  a  time)  can help  to  relieve  pain,  reduce  swelling  and  bruising.      - Elevation  of  the  affected  area  can  also  help  to  reduce  pain  and  swelling. Did  you  receive  a  nerve  Block? A  nerve  block  can  provide  pain  relief  for  one  hour  to  two  days  after  your  surgery. As  long  as  the  nerve  block  is  working,  you  will  experience  little  or  no  sensation  in  the  area  the  surgeon  operated  on. As  the  nerve  block  wears  off,  you  will  begin  to  experience  pain  or  discomfort. It  is  very  important  that  you  begin  taking  your  prescribed  pain  medication  before  the  nerve  block  fully  wears  off. The first sign that the nerve block is wearing off is tingling in your fingers. Treating  your  pain  at  the  first  sign  of  the  block  wearing  off  will  ensure  your  pain  is  better  controlled  and  more  tolerable  when  full-sensation  returns. Do  not  wait  until  the  pain  is  intolerable,  as  the  medicine  will  be  less  effective. It  is  better  to  treat  pain  in  advance  than  to  try  and  catch  up. General  Anesthesia or Sedation:      If  you  did  not  receive  a  nerve  block  during  your  surgery,  you  will  need  to  start  taking  your  pain  medication  shortly  after  your  surgery  and  should  continue  to  do  so  as  prescribed  by  your  surgeon. Please  call  [phone]  with any concern and ask to speak with Latesha Guardian. Concerning problems include:      -  Excessive  redness  of  the  incisions      -  Drainage  for  more  than  2  Days after surgery or any foul smelling drainage  -  Fever  of  more  than  101.5  F      Please  call  [phone]  if  you  do  not  receive  or  are  unsure  of  your  first  follow-up  appointment. You  should  see  the  doctor  10-14  days  after  your  Surgery. Thank you for choosing me and [address] [place] [address] for your care.  I will go above and beyond to ensure you receive the best care possible. Syed Perkins MD      CALL Sable Gilford IF     Call your doctor if pain is NOT relieved by medication.   Excessive bleeding that does not stop after holding pressure over the area  · Temperature of 101 degrees F or above  · Excessive redness, swelling or bruising, and/ or green or yellow, smelly discharge from incision      TYPICAL SIDE EFFECTS OF PAIN MEDICATION:     Constipation: Drink lots of fluids. Over the counter stool softener if needed.    Nausea: Take pain medication with food. Call your doctor with persistent nausea. ACTIVITY  · As tolerated and as directed by your doctor. · Bathe or shower as directed by your doctor. DIET  · Day of surgery: Clear liquids until no nausea or vomiting; small portion, light diet [place] foods (ex: baked chicken, plain rice, grits, scrambled eggs, toast). Nothing greasy, fried or spicy today. · Advance to regular diet on second day, unless your doctor orders otherwise. · If nausea and vomiting continues, call your doctor. PAIN  · Take pain medication as directed by your doctor. · DO NOT take aspirin or blood thinners unless directed by your doctor. AFTER ANESTHESIA   · For the first 24 hours: DO NOT Drive, Drink alcoholic beverages, or Make important decisions. · Be aware of dizziness following anesthesia and while taking pain medication. CONTRACEPTIVES  During your procedure you potentially received medication(s) which may reduce the effectiveness of contraceptives. Please consider other forms of contraception for 1 month following your procedure if you are currently using contraceptives as your primary form of birth control. In addition to this, it is recommended you continue your contraceptive as prescribed, unless otherwise instructed by your physician.         PATIENT INSTRUCTIONS:    After general anesthesia or intravenous sedation, for 24 hours or while taking prescription Narcotics:  · Limit your activities  · Do not drive and operate hazardous machinery  · Do not make important personal or business decisions  · Do  not drink alcoholic beverages  · If you have not urinated within 8 hours after discharge, please contact your surgeon on call. *  Please give a list of your current medications to your Primary Care Provider. *  Please update this list whenever your medications are discontinued, doses are      changed, or new medications (including over-the-counter products) are added. *  Please carry medication information at all times in case of emergency situations. Preventing Infection at Home  We care about preventing infection and avoiding the spread of germs - not only when you are in the hospital but also when you return home. When you return home from the hospital, its important to take the following steps to help prevent infection and avoid spreading germs that could infect you and others. Ask everyone in your home to follow these guidelines, too. Clean Your Hands  · Clean your hands whenever your hands are visibly dirty, before you eat, before or after touching your mouth, nose or eyes, and before preparing food. Clean them after contact with body fluids, using the restroom, touching animals or changing diapers. · When washing hands, wet them with warm water and work up a lather. Rub hands for at least 15 seconds, then rinse them and pat them dry with a clean towel or paper towel. · When using hand sanitizers, it should take about 15 seconds to rub your hands dry. If not, you probably didnt apply enough . Cover Your Sneeze or Cough  Germs are released into the air whenever you sneeze or cough. To prevent the spread of infection:  · Turn away from other people before coughing or sneezing. · Cover your mouth or nose with a tissue when you cough or sneeze. Put the tissue in the trash.   · If you dont have a tissue, cough or sneeze into your upper sleeve, not your hands. · Always clean your hands after coughing or sneezing. Care for Wounds  Your skin is your bodys first line of defense against germs, but an open wound leaves an easy way for germs to enter your body. To prevent infection:  · Clean your hands before and after changing wound dressings, and wear gloves to change dressings if recommended by your doctor. · Take special care with IV lines or other devices inserted into the body. If you must touch them, clean your hands first.  · Follow any specific instructions from your doctor to care for your wounds. Contact your doctor if you experience any signs of infection, such as fever or increased redness at the surgical or wound site. Keep a Clean Home  · Clean or wipe commonly touched hard surfaces like door handles, sinks, tabletops, phones and TV remotes. · Use products labeled disinfectant to kill harmful bacteria and viruses. · Use a clean cloth or paper towel to clean and dry surfaces. Wiping surfaces with a dirty dishcloth, sponge or towel will only spread germs. · Never share toothbrushes, nguyen, drinking glasses, utensils, razor blades, face cloths or bath towels to avoid spreading germs. · Be sure that the linens that you sleep on are clean. · Keep pets away from wounds and wash your hands after touching pets, their toys or bedding. We care about you and your health. Remember, preventing infections is a team effort between you, your family, friends and health care providers. These are general instructions for a healthy lifestyle:    No smoking/ No tobacco products/ Avoid exposure to second hand smoke  Surgeon General's Warning:  Quitting smoking now greatly reduces serious risk to your health.   Obesity, smoking, and sedentary lifestyle greatly increases your risk for illness  A healthy diet, regular physical exercise & weight monitoring are important for maintaining a healthy lifestyle  You may be retaining fluid if you have a history of heart failure or if you experience any of the following symptoms:  Weight gain of 3 pounds or more overnight or 5 pounds in a week, increased swelling in our hands or feet or shortness of breath while lying flat in bed. Please call your doctor as soon as you notice any of these symptoms; do not wait until your next office visit. Recognize signs and symptoms of STROKE:  F-face looks uneven  A-arms unable to move or move unevenly  S-speech slurred or non-existent  T-time-call 911 as soon as signs and symptoms begin-DO NOT go       Back to bed or wait to see if you get better-TIME IS BRAIN. Learning About Coronavirus [phone])  Coronavirus [phone]): Overview  What is coronavirus (COVID-19)? The coronavirus disease (COVID-19) is caused by a virus. It is an illness that was first found in [place], [place], in December 2019. It has since spread worldwide. The virus can cause fever, cough, and trouble breathing. In severe cases, it can cause pneumonia and make it hard to breathe without help. It can cause death. Coronaviruses are a large group of viruses. They cause the common cold. They also cause more serious illnesses like [place] East respiratory syndrome (MERS) and severe acute respiratory syndrome (SARS). COVID-19 is caused by a novel coronavirus. That means it's a new type that has not been seen in people before. This virus spreads person-to-person through droplets from coughing and sneezing. It can also spread when you are close to someone who is infected. And it can spread when you touch something that has the virus on it, such as a doorknob or a tabletop. What can you do to protect yourself from coronavirus (COVID-19)? The best way to protect yourself from getting sick is to:  · Avoid areas where there is an outbreak. · Avoid contact with people who may be infected. · Wash your hands often with soap or alcohol-based hand sanitizers.   · Avoid crowds and try to stay at least 6 feet away from other people. · Wash your hands often, especially after you cough or sneeze. Use soap and water, and scrub for at least 20 seconds. If soap and water aren't available, use an alcohol-based hand . · Avoid touching your mouth, nose, and eyes. What can you do to avoid spreading the virus to others? To help avoid spreading the virus to others:  · Cover your mouth with a tissue when you cough or sneeze. Then throw the tissue in the trash. · Use a disinfectant to clean things that you touch often. · Wear a cloth face cover if you have to go to public areas. · Stay home if you are sick or have been exposed to the virus. Don't go to school, work, or public areas. And don't use public transportation, ride-shares, or taxis unless you have no choice. · If you are sick:  ? Leave your home only if you need to get medical care. But call the doctor's office first so they know you're coming. And wear a face cover. ? Wear the face cover whenever you're around other people. It can help stop the spread of the virus when you cough or sneeze. ? Clean and disinfect your home every day. Use household  and disinfectant wipes or sprays. Take special care to clean things that you grab with your hands. These include doorknobs, remote controls, phones, and handles on your refrigerator and microwave. And don't forget countertops, tabletops, bathrooms, and computer keyboards. When to call for help  [id number] anytime you think you may need emergency care. For example, call if:  · You have severe trouble breathing. (You can't talk at all.)  · You have constant chest pain or pressure. · You are severely dizzy or lightheaded. · You are confused or can't think clearly. · Your face and lips have a blue color. · You pass out (lose consciousness) or are very hard to wake up. Call your doctor now if you develop symptoms such as:  · Shortness of breath. · Fever. · Cough.   If you need to get care, call ahead to the doctor's office for instructions before you go. Make sure you wear a face cover to prevent exposing other people to the virus. Where can you get the latest information? The following health organizations are tracking and studying this virus. Their websites contain the most up-to-date information. Amos Jacklyn also learn what to do if you think you may have been exposed to the virus. · U.S. Centers for Disease Control and Prevention (CDC): The CDC provides updated news about the disease and travel advice. The website also tells you how to prevent the spread of infection. www.cdc.gov  · World Health Organization [facility]): WHO offers information about the virus outbreaks. WHO also has travel advice. www.who.int  Current as of: May 8, 2020               Content Version: 12.5  © 2006-2020 [employer]. Care instructions adapted under license by [employer] (which disclaims liability or warranty for this information). If you have questions about a medical condition or this instruction, always ask your healthcare professional. [address] any warranty or liability for your use of this information.

## 2022-02-16 NOTE — PERIOP NOTES
Pt has spoken with [facility] DOS and 2mg Versed given IV per order. Pt placed on pulse oximetry. 02 Sat 99% on room air. SR up x2.

## 2022-02-16 NOTE — OP NOTES
Hand Surgery Operative Note      Davy Conti   62 y.o.   female      Pre-op diagnosis: Right middle Trigger Finger   Post op diagnosis: same      Procedure: Right  middle Trigger Finger Release     Surgeon: Naman Jarvis MD     Anesthesia: Local + MAC     Tourniquet time:   Total Tourniquet Time Documented:  Forearm (Right) - 4 minutes  Total: Forearm (Right) - 4 minutes        Procedure indications: Patient with catching and locking of the above mentioned finger(s) which have been recalcitrant to nonoperative measures. After Thorough discussion, the patient decided to proceed with surgical management. We discussed in detail surgical risks including scar, pain, bleeding, infection, anesthetic risks, neurovascular injury, need for further surgery,  weakness, stiffness, risk of death and potential risk of other unforseen complication. Procedure description:      Patient was placed in the supine position and after appropriate time-out and side, site and procedure confirmed. Local anesthesia was infiltrated with Lidocaine 2% plain and 0.25% Bupivacaine plain. A longitudinal incision was made along the middle finger A1 pulley under loupe magnification. Blunt dissection was used to identify the A1 pulley as well as the neurovascular bundle on each side of the flexor tendon. Blunt retraction was used to protect the neurovascular bundles. Sharp incision was made on top of the A1 pulley and scissor dissection was used to extend the sheath incision proximally to release the palmar pulley and distally until the A2 pulley was encountered. The flexor tendons were then mobilized and not catching or clicking was identified. Wound was irrigated and hemostasis was obtained with bipolar cautery. Wound then closed with 4-0 nylon and sterile dressing applied. Disposition: To PACU with no complications and follow up per routine.   Patient is instructed to remove dressings in five days and other precautions include avoidance of heavy and repetitive lifting for 2 weeks, when an appointment for follow up and suture removal will take place.      Vasquez Akers MD  02/16/22  10:44 AM

## 2022-02-16 NOTE — ANESTHESIA POSTPROCEDURE EVALUATION
Procedure(s):  RIGHT MIDDLE FINGER TRIGGER RELEASE.    total IV anesthesia    Anesthesia Post Evaluation      Multimodal analgesia: multimodal analgesia used between 6 hours prior to anesthesia start to PACU discharge  Patient location during evaluation: PACU  Patient participation: complete - patient participated  Level of consciousness: awake and alert  Pain management: adequate  Airway patency: patent  Anesthetic complications: no  Cardiovascular status: acceptable  Respiratory status: acceptable  Hydration status: acceptable  Post anesthesia nausea and vomiting:  controlled  Final Post Anesthesia Temperature Assessment:  Normothermia (36.0-37.5 degrees C)      INITIAL Post-op Vital signs:   Vitals Value Taken Time   /68 02/16/22 1050   Temp 36.6 °C (97.9 °F) 02/16/22 1043   Pulse 62 02/16/22 1050   Resp 18 02/16/22 1050   SpO2 100 % 02/16/22 1050

## 2022-02-16 NOTE — H&P
History and Physical       Name: Abi Appiah  YOB: 1960  Gender: female  MRN: [id number]     CC: Follow up for trigger finger     HPI: Patient is a 62 y.o. female who returns today for reevaluation of a right middle trigger finger. Last visit we provided cortisone injection did alleviate her symptoms for several months. .     ROS/Meds/PSH/PMH/FH/SH: I personally reviewed the patients standard intake form. Pertinents are discussed in the HPI     Physical Examination:    The patient is alert and oriented  Cardiovascular: regular rate and rhythm  Respiratory: Non labored breathing    Musculoskeletal:   Examination on the Right upper extremity demonstrates, normal sensation and good cap refill in all fingers, positive tenderness over the middle A1 pulley with palpable clicking and positive  locking.     Imaging / Electrodiagnostic Tests:      none     Assessment:       ICD-10-CM ICD-9-CM     1. Trigger middle finger of right hand  M65.331 727.03           Plan:  We discussed the diagnosis and different treatment options. We discussed observation, splinting, cortisone injections and surgical release of the A1 pulley. We discussed that stenosing tenosynovitis at the level of the A1 pulley AKA trigger finger is a chronic condition regardless of how long the symptoms have been present, this most likely has been progressing for much longer than the symptoms were evident and it will likely persist for a long time without medical treatment. Furthermore, many patients require surgical release at some point despite some short-term benefits of conservative treatment. After discussing in detail the patient elects to proceed with surgical treatment.         Patient understands risks and benefits of right middle trigger finger release including but not limited to nerve injury, vessel injury, infection, failure to achieve desired results and possible need for additional surgery.  Patient understands and wishes to proceed with surgery.     Delmar Samano MD   02/16/22  9:50 AM

## 2022-02-16 NOTE — PERIOP NOTES
PACU DISCHARGE NOTE    Patient's spouse, Jackie Thakkar, voiced understanding of discharge instructions. Vital signs stable, pain well controlled, alert and oriented times three or at baseline, follow up per surgeon, no anesthetic complications.

## 2022-02-22 ENCOUNTER — HOSPITAL ENCOUNTER (OUTPATIENT)
Dept: PHYSICAL THERAPY | Age: 62
Discharge: HOME | End: 2022-02-22
Payer: OTHER GOVERNMENT

## 2022-02-22 PROCEDURE — 97110 THERAPEUTIC EXERCISES: CPT

## 2022-02-22 PROCEDURE — 97140 MANUAL THERAPY 1/> REGIONS: CPT

## 2022-02-22 NOTE — THERAPY DISCHARGE
Sergey Esquivel  : 1960  Primary: [facility]  Secondary:  [address] at [facility]  [address] [place], [address] [place] [address], [place], [address] [place] [address]  [phone]   [phone]       OUTPATIENT PHYSICAL THERAPY:Discharge 2022    ICD-10: Pain in right knee (M25.561)             Low back pain (M54.4)             Other abnormalities of gait and mobility (R26.89)   Precautions: pre-diabetic, migraines  Allergies: Amoxicillin and Penicillins   TREATMENT PLAN:  Effective Dates: 1/3/2022 TO 3/4/2022 (60 days). Frequency/Duration: 2 times a week for 60 Day(s) MEDICAL/REFERRING DIAGNOSIS:  Pain in right knee [M25.561]  Low back pain, unspecified [M54.50]   DATE OF ONSET: Patient reports her low back pain starting in November after lifting bags of mulch and working in her yard. REFERRING PHYSICIAN: Octavio Caal MD MD Orders: Evaluate and Treat   Return MD Appointment: 3/15/2022 for lab work, no appointment for low back/ knee     INITIAL ASSESSMENT:  Ms. Gene Figueroa is a 62 y.o. female presenting to physical therapy with complaints of  low back and R knee pain which began in November after mulching her yard. She reports her  having a back problem and having to do the heavy lifting at home which she feels contributed to her back pain. She has taken muscle relaxants with minimal change. X-ray indicative of mild osteoarthritis in her lumbar spine and R knee per chart review. Patient does report feeling better now than she did when it first started. Reports less R LE symptoms, but continues to have a constant ache in her lower back, intermittent R anterior knee pain, and occasional R LE symptoms. She is eager to reduce her symptoms and return to her prior activity level including yoga.  Patient presents with increased pain, decreased strength, decreased ROM, decreased flexibility, impaired gait, impaired transfer ability, decreased activity tolerance, and overall impaired functional mobility. Patient is a good candidate for skilled physical therapy interventions to include manual therapy, therapeutic exercise, balance training, gait training, transfer training, postural re-education, body mechanics training, and pain modalities as needed. DISCHARGE 2/22/2022: Patient has been seen for 10 sessions of physical therapy from 1/3/2022 to 2/22/2022. She reports feeling feeling much better since starting therapy and feels ready to discharge to home program at this time. Advised patient to contact office with any questions or concerns. Safe for discharge at this time. PROBLEM LIST (Impacting functional limitations):  1. Decreased Strength  2. Decreased ADL/Functional Activities  3. Decreased Transfer Abilities  4. Decreased Ambulation Ability/Technique  5. Decreased Balance  6. Increased Pain  7. Decreased Activity Tolerance  8. Decreased Pacing Skills  9. Decreased Work Simplification/Energy Conservation Techniques  10. Decreased Flexibility/Joint Mobility INTERVENTIONS PLANNED: (Treatment may consist of any combination of the following)  1. Balance Exercise  2. Bed Mobility  3. Cold  4. Cryotherapy  5. Electrical Stimulation  6. Family Education  7. Gait Training  8. Heat  9. Home Exercise Program (HEP)  10. Manual Therapy  11. Neuromuscular Re-education/Strengthening  12. Range of Motion (ROM)  13. Therapeutic Activites  14. Therapeutic Exercise/Strengthening  15. Transfer Training  16. Ultrasound (US)  17. Trigger Point Dry Needling     GOALS: (Goals have been discussed and agreed upon with patient.)  Short-Term Functional Goals: Time Frame: 1/3/2022 to 2/3/2022  1. Patient demonstrates independence with home exercise program without verbal cueing provided by therapist. -GOAL MET  2. Patient will report no more than 1/10 low back pain at rest in order to demonstrate improved self pain control and tolerance. -GOAL MET  3.  Patient will be educated in and demonstrate proper squat lift technique in order to reduce stress on bilateral LE and lumbar spine, improve safety, and reduce risk of injury. -GOAL MET   4. Patient will report no R knee buckling since starting therapy in order to demonstrate improving stability and functional mobility. -GOAL  MET  Discharge Goals: Time Frame: 1/3/2022 to 3/4/2022  1. Patient will report minimal to no low back or R knee pain with car transfers, sit to stand, and bed mobility in order to improve quality of life. -GOAL MET  2. Patient will be able to perform stairs with reciprocal pattern up and down with minimal R knee pain, even with carrying light weight, in order to improve negotiation around her home with groceries. -GOAL MET  3. Patient will report no R LE symptoms associated with sciatic pain in order to demonstrate improved centralization of initial symptoms. -GOAL MET  4. Patient will improve Modified Oswestry Scale score to less than 10/50. -GOAL MET  5. Patient will improve Lower Extremity Functional Scale score to 57/80 from 49/80. -GOAL MET    Outcome Measure: Tool Used: Lower Extremity Functional Scale (LEFS)  Score:  Initial: 49/80 Most Recent: 58/80 (Date: 1/24/2022 )                       52/80 (Date: 2/22/2022)   Interpretation of Score: 20 questions each scored on a 5 point scale with 0 representing \"extreme difficulty or unable to perform\" and 4 representing \"no difficulty\". The lower the score, the greater the functional disability. 80/80 represents no disability. Minimal detectable change is 9 points. Tool Used: Modified Oswestry Low Back Pain Questionnaire  Score:  Initial: 10/50  Most Recent: 2/50 (Date: 1/24/2022 )                       1/50 (Date: 2/2/2022)   Interpretation of Score: Each section is scored on a 0-5 scale, 5 representing the greatest disability. The scores of each section are added together for a total score of 50.       UPDATED OBJECTIVE FINDINGS:  (from 1/24/2022)  Observation/Orthostatic Postural Assessment:          Patient with slightly widened base of support, decreased weight shift to R LE, slight R knee flexion in stance, decreased lumbar lordosis, slightly increased thoracic kyphosis, mild thoracic scoliosis. Palpation:          Moderate (from moderate to significant) tenderness to palpation of upper lumbar spine, mild to lower lumbar. Moderate (from significant) tenderness to R piriformis with moderate tightness noted. Minimal to moderate tenderness and tightness R IT band. ROM:    AROM (degrees)   Lumbar Flexion 60   Lumbar Extension 10     AROM/ PROM Left (degrees) Right (degrees)   Knee Flexion 145 145   Knee Extension 3 2     Strength: Motion Tested Left   (*/5) Right  (*/5)   Hip Flexion 5 5   Hip Extension 4+ 4   Hip Abduction 4+ 4   Knee Flexion 4+ 4+   Knee Extension 5 4+   Ankle Dorsiflexion 5 5   Gross core strength 3/5 as observed with transfers. Passive Accessory Motion:         Moderate (from significant) limitation with posterior to anterior mobilizations of the lumbar spine and sacrum. Functional Mobility:         Gait/Ambulation: slow spenser with mild antalgia, decreased stance time R LE, increased lateral sway. Transfers:  Minimal (from minimal to moderate) use of bilateral UE for sit to stand transfers        Stairs:  Reports ascending stairs one at a time, especially when carrying groceries or other items. Reciprocal pattern descending stairs. Patient reports less to no pain with initial walking, car transfers, and sometimes with bed mobility. Balance:          Sitting balance intact. Standing balance mildly limited    Reason for Services/Other Comments:  · Patient self discharged to [facility]. Rehabilitation Potential For Stated Goals: Good  Regarding Sheba Ram's therapy, I certify that the treatment plan above will be carried out by a therapist or under their direction.   Thank you for this referral,  Karena Grewal, PT     Referring Physician Signature: Alan Singer MD No Signature is Required for this note.

## 2022-02-22 NOTE — PROGRESS NOTES
Jose Bolus  : 1960  Primary: [facility]  Secondary:  [address] at [facility]  [address] [place], [address], [place], [address] [place] [address]  [phone]   [phone]      OUTPATIENT PHYSICAL THERAPY: Daily Treatment Note 2022    ICD-10: Pain in right knee (M25.561)             Low back pain (M54.4)             Other abnormalities of gait and mobility (R26.89)   Precautions: pre-diabetic, migraines  Allergies: Amoxicillin and Penicillins   TREATMENT PLAN:  Effective Dates: 1/3/2022 TO 3/4/2022 (60 days). Frequency/Duration: 2 times a week for 60 Day(s) MEDICAL/REFERRING DIAGNOSIS:  Pain in right knee [M25.561]  Low back pain, unspecified [M54.50]   DATE OF ONSET: Patient reports her low back pain starting in November after lifting bags of mulch and working in her yard. REFERRING PHYSICIAN: Anthony Fierro MD MD Orders: Evaluate and Treat   Return MD Appointment: 3/15/2022 for lab work, no appointment for low back/ knee     Pre-treatment Symptoms/Complaints:  Patient reports no pain today and is ready for DC to [facility]    Pain: Initial: Pain Intensity 1: 0  Pain Location 1: Back  Pain Orientation 1: Lower,Right  Pain Intensity 2: 0  Pain Location 2: Leg  Pain Orientation 2: Right  Post Session:  0/10   Medications Last Reviewed:  2022  Updated Objective Findings:  None Today   TREATMENT:   THERAPEUTIC EXERCISE: (10 minutes):  Exercises per grid below to improve mobility, strength, balance and coordination. Required moderate visual, verbal, manual and tactile cues to promote proper body alignment, promote proper body posture and promote proper body mechanics. Progressed resistance, range, repetitions and complexity of movement as indicated.      Date:  2022 Date:  2022 Date:  2/15/22   Activity/Exercise Parameters Parameters Parameters   LTR 2 X 20 each     2 x 20 each side 2 X 20 each side   Hamstring stretch  Strap 4 x 30 seconds each Strap, 5 x 30 seconds each   Piriformis stretch 3 x 30 seconds each 4 x 30 seconds each 3 x 30 seconds each   Pelvic tilts X 20 X 20  2 X 20   Transverse abdominus activation 10 x 10 seconds 15 x 10 seconds 10 x 10 seconds   TA hip abduction  Blue 3 x 15 Blue, 3 x 10   TA marching --- 2 x 10 Green 2 x 10   Stability ball knee to chest  ---    Bridging 1 x 20 Green abduction, 2 x 10 Green  3 x 10   Clamshells --- Yellow, 2 x 10  3 x 10   Sit to stand --- 2 x 10      Time spent with patient reviewing proper muscle recruitment and technique with exercises. MANUAL THERAPY: (30 minutes): Joint mobilization, Soft tissue mobilization and Manipulation was utilized and necessary because of the patient's restricted joint motion, painful spasm, loss of articular motion and restricted motion of soft tissue   Soft tissue mobilization to lumbar region to decrease tightness and pain   Trigger point release to R piriformis to decrease tightness and spasms    Soft tissue mobilization to R lateral hip, gluteals, and piriformis to decrease pain and tightness   Prone posterior to anterior mobilizations of the thoracic and lumbar spine grades III-IV to improve mobility and decrease pain     MODALITIES: (0 minutes):      none today     HEP: As above; handouts given to patient for all exercises.     Treatment/Session Summary:    · Response to Treatment:  Decreased tightness right low back   · Communication/Consultation:  None today  · Equipment provided today:  None today  · Recommendations/Intent for next treatment session: Discharge to [facility] per patient request.    Total Treatment Billable Duration:  40 minutes   PT Patient Time In/Time Out  Time In: 0908  Time Out: 1989 [facility], [facility]

## 2022-02-28 NOTE — PROGRESS NOTES
I am accessing Ms. Ram's chart as a part of our department's internal chart auditing process. I certify that Ms. Hunter Man is, or was, a patient in our department.   Thank you,  Milind Mayers, PT  2/28/2022

## 2022-03-01 ENCOUNTER — APPOINTMENT (OUTPATIENT)
Dept: PHYSICAL THERAPY | Age: 62
End: 2022-03-01

## 2022-03-18 PROBLEM — N94.10 DYSPAREUNIA, FEMALE: Status: ACTIVE | Noted: 2019-01-28

## 2022-03-18 PROBLEM — J30.2 SEASONAL ALLERGIC RHINITIS: Status: ACTIVE | Noted: 2017-05-01

## 2022-03-19 PROBLEM — R91.1 PULMONARY NODULE: Status: ACTIVE | Noted: 2017-06-23

## 2022-03-19 PROBLEM — N89.6 TIGHT INTROITUS: Status: ACTIVE | Noted: 2019-01-28

## 2022-03-19 PROBLEM — E11.65 TYPE 2 DIABETES MELLITUS WITH HYPERGLYCEMIA, WITHOUT LONG-TERM CURRENT USE OF INSULIN (HCC): Status: ACTIVE | Noted: 2021-04-13

## 2022-03-19 PROBLEM — K76.0 HEPATIC STEATOSIS DETERMINED BY BIOPSY OF LIVER: Status: ACTIVE | Noted: 2019-04-26

## 2022-03-19 PROBLEM — G43.009 MIGRAINE WITHOUT AURA AND WITHOUT STATUS MIGRAINOSUS, NOT INTRACTABLE: Status: ACTIVE | Noted: 2021-03-16

## 2022-03-19 PROBLEM — C44.91 BASAL CELL CARCINOMA: Status: ACTIVE | Noted: 2017-10-07

## 2022-03-19 PROBLEM — L98.9 HAND LESION: Status: ACTIVE | Noted: 2019-04-26

## 2022-05-13 DIAGNOSIS — E11.65 TYPE 2 DIABETES MELLITUS WITH HYPERGLYCEMIA, WITHOUT LONG-TERM CURRENT USE OF INSULIN (HCC): Primary | ICD-10-CM

## 2022-06-02 DIAGNOSIS — E89.0 HYPOTHYROIDISM FOLLOWING RADIOIODINE THERAPY: Primary | ICD-10-CM

## 2022-06-06 ENCOUNTER — OFFICE VISIT (OUTPATIENT)
Dept: ORTHOPEDIC SURGERY | Age: 62
End: 2022-06-06
Payer: OTHER GOVERNMENT

## 2022-06-06 DIAGNOSIS — G56.02 LEFT CARPAL TUNNEL SYNDROME: Primary | ICD-10-CM

## 2022-06-06 PROCEDURE — 99213 OFFICE O/P EST LOW 20 MIN: CPT | Performed by: ORTHOPAEDIC SURGERY

## 2022-06-06 NOTE — PROGRESS NOTES
Orthopaedic Hand Surgery Note    Name: Balbina France  YOB: 1960  Gender: female  MRN: [id number]    CC: Follow up of hand numbness    HPI: Patient is a 62 y.o. female who is here regarding follow up for  hand numbness and tingling. She states that it is getting worse. ROS/Meds/PSH/PMH/FH/SH: I personally reviewed the patients standard intake form. Pertinents are discussed in the HPI    Physical Examination:  Musculoskeletal:     Examination of the left upper extremity demonstrates Normal sensation to light touch in the median distribution, normal sensation in ulnar and radial distribution, Positive carpal tunnel compression testing and Phalen testing, cap refill < 5 seconds in all fingers. Inspection reveals no thenar atrophy. Negative Tinel and elbow flexion compression test of the cubital tunnel, negative Tinel over Guyon's canal. Sensation to light touch in the ulnar 2 digits is normal with no intrinsic atrophy/weakness. No tenderness to palpation or masses noted in the forearm. Imaging / Electrodiagnostic Tests:     none    Assessment:     ICD-10-CM    1. Left carpal tunnel syndrome  G56.02 Ambulatory referral to Neurology       Plan:  We discussed the diagnosis and different treatment options. We discussed observation, EMG/NCV, night splinting, cortisone injections and surgical decompression and the risks and benefits of all were clearly outlined. After discussing in detail the patient elects to proceed with obtaining a nerve conduction study. Once this is complete she will return and we will discuss the results and further treatment options. .     Patient voiced accordance and understanding of the information provided and the formulated plan. All questions were answered to the patient's satisfaction during the encounter.       Rosa Hopkins MD  Orthopaedic Surgery  06/06/22  2:52 PM

## 2022-06-15 DIAGNOSIS — F32.A DEPRESSIVE DISORDER: Primary | ICD-10-CM

## 2022-06-15 DIAGNOSIS — E11.65 TYPE 2 DIABETES MELLITUS WITH HYPERGLYCEMIA, WITHOUT LONG-TERM CURRENT USE OF INSULIN (HCC): ICD-10-CM

## 2022-06-15 RX ORDER — BUPROPION HYDROCHLORIDE 300 MG/1
300 TABLET ORAL EVERY MORNING
Qty: 90 TABLET | Refills: 3 | Status: SHIPPED | OUTPATIENT
Start: 2022-06-15 | End: 2022-09-20

## 2022-06-15 NOTE — TELEPHONE ENCOUNTER
Requested Prescriptions     Pending Prescriptions Disp Refills    buPROPion (WELLBUTRIN XL) 300 MG extended release tablet 90 tablet 3     Sig: Take 1 tablet by mouth every morning    metFORMIN (GLUCOPHAGE) 500 MG tablet 90 tablet 3     Sig: Take one tablet a day with evening meal     Dose verified and to [facility]. Patient is scheduled for follow up visit.

## 2022-08-03 ENCOUNTER — OFFICE VISIT (OUTPATIENT)
Dept: PHYSICAL MEDICINE AND REHAB | Age: 62
End: 2022-08-03
Payer: OTHER GOVERNMENT

## 2022-08-03 VITALS
SYSTOLIC BLOOD PRESSURE: 113 MMHG | WEIGHT: 133 LBS | DIASTOLIC BLOOD PRESSURE: 79 MMHG | HEART RATE: 83 BPM | HEIGHT: 62 IN | BODY MASS INDEX: 24.48 KG/M2

## 2022-08-03 DIAGNOSIS — G56.02 LEFT CARPAL TUNNEL SYNDROME: Primary | ICD-10-CM

## 2022-08-03 PROCEDURE — 95909 NRV CNDJ TST 5-6 STUDIES: CPT | Performed by: PHYSICAL MEDICINE & REHABILITATION

## 2022-08-03 NOTE — PROGRESS NOTES
This patient presented for a nerve conduction study today. Study is completed. Please see scanned document located in the procedure section/tab of the medical record.

## 2022-08-08 ENCOUNTER — OFFICE VISIT (OUTPATIENT)
Dept: ORTHOPEDIC SURGERY | Age: 62
End: 2022-08-08
Payer: OTHER GOVERNMENT

## 2022-08-08 VITALS — WEIGHT: 132 LBS | HEIGHT: 62 IN | BODY MASS INDEX: 24.29 KG/M2

## 2022-08-08 DIAGNOSIS — G56.02 LEFT CARPAL TUNNEL SYNDROME: Primary | ICD-10-CM

## 2022-08-08 PROCEDURE — 99214 OFFICE O/P EST MOD 30 MIN: CPT | Performed by: ORTHOPAEDIC SURGERY

## 2022-08-08 NOTE — PROGRESS NOTES
Orthopaedic Hand Surgery Note    Name: Josep Bhandari  YOB: 1960  Gender: female  MRN: [id number]    CC: Follow up of hand numbness    HPI: Patient is a 65 y.o. female who is here regarding follow up for  hand numbness and tingling. She is here to review her nerve conduction study. ROS/Meds/PSH/PMH/FH/SH: I personally reviewed the patients standard intake form. Pertinents are discussed in the HPI    Physical Examination:  Musculoskeletal:     Examination of the left upper extremity demonstrates Normal sensation to light touch in the median distribution, normal sensation in ulnar and radial distribution, Positive carpal tunnel compression testing and Phalen testing, cap refill < 5 seconds in all fingers. Inspection reveals no thenar atrophy. Negative Tinel and elbow flexion compression test of the cubital tunnel, negative Tinel over Guyon's canal. Sensation to light touch in the ulnar 2 digits is normal with no intrinsic atrophy/weakness. No tenderness to palpation or masses noted in the forearm. Imaging / Electrodiagnostic Tests:     I independently reviewed and interpreted the patient's nerve conduction study. She has findings consistent with moderate left carpal tunnel syndrome. Left median sensory latency is 4.2 ms, left median motor conduction is normal.    Ultrasound evaluation of the Left wrist and carpal tunnel (limited, nonvascular)   Indications: Left carpal tunnel syndrome  Findings: Median nerve cross sectional area is 13mm at the level of the lunate (normal < 10mm). Transverse safe zone is >4mm. No other abnormalities were noted. Conclusion: increased median nerve cross sectional area, consistent with carpal tunnel syndrome      Assessment:     ICD-10-CM    1. Left carpal tunnel syndrome  G56.02 US EXTREMITY LEFT NON VASC LIMITED     Case Request          Plan:  We discussed the diagnosis and different treatment options.  We discussed observation, EMG/NCV, night splinting, cortisone injections and surgical decompression and the risks and benefits of all were clearly outlined. After discussing in detail the patient elects to proceed with surgical treatment. Patient understands risks and benefits of left ultra-sound guided carpal tunnel release including but not limited to nerve injury, vessel injury, infection, failure to achieve desired results and possible need for additional surgery. Patient understands and wishes to proceed with surgery. On Exam:   The patient is alert and oriented  Cardiovascular: regular rate and rhythm  Respiratory: Non labored breathing       Patient voiced accordance and understanding of the information provided and the formulated plan. All questions were answered to the patient's satisfaction during the encounter.       Dorian Sellers MD  Orthopaedic Surgery  08/08/22  9:45 AM

## 2022-08-08 NOTE — H&P (VIEW-ONLY)
Orthopaedic Hand Surgery Note    Name: Ayad Cook  YOB: 1960  Gender: female  MRN: [id number]    CC: Follow up of hand numbness    HPI: Patient is a 65 y.o. female who is here regarding follow up for  hand numbness and tingling. She is here to review her nerve conduction study. ROS/Meds/PSH/PMH/FH/SH: I personally reviewed the patients standard intake form. Pertinents are discussed in the HPI    Physical Examination:  Musculoskeletal:     Examination of the left upper extremity demonstrates Normal sensation to light touch in the median distribution, normal sensation in ulnar and radial distribution, Positive carpal tunnel compression testing and Phalen testing, cap refill < 5 seconds in all fingers. Inspection reveals no thenar atrophy. Negative Tinel and elbow flexion compression test of the cubital tunnel, negative Tinel over Guyon's canal. Sensation to light touch in the ulnar 2 digits is normal with no intrinsic atrophy/weakness. No tenderness to palpation or masses noted in the forearm. Imaging / Electrodiagnostic Tests:     I independently reviewed and interpreted the patient's nerve conduction study. She has findings consistent with moderate left carpal tunnel syndrome. Left median sensory latency is 4.2 ms, left median motor conduction is normal.    Ultrasound evaluation of the Left wrist and carpal tunnel (limited, nonvascular)   Indications: Left carpal tunnel syndrome  Findings: Median nerve cross sectional area is 13mm at the level of the lunate (normal < 10mm). Transverse safe zone is >4mm. No other abnormalities were noted. Conclusion: increased median nerve cross sectional area, consistent with carpal tunnel syndrome      Assessment:     ICD-10-CM    1. Left carpal tunnel syndrome  G56.02 US EXTREMITY LEFT NON VASC LIMITED     Case Request          Plan:  We discussed the diagnosis and different treatment options.  We discussed observation, EMG/NCV, night

## 2022-08-23 ENCOUNTER — PATIENT MESSAGE (OUTPATIENT)
Dept: INTERNAL MEDICINE CLINIC | Facility: CLINIC | Age: 62
End: 2022-08-23

## 2022-08-24 RX ORDER — CHLORHEXIDINE GLUCONATE 0.12 MG/ML
RINSE ORAL DAILY
COMMUNITY
Start: 2022-05-17

## 2022-08-24 NOTE — TELEPHONE ENCOUNTER
From: Kody Zhang  To: Dr. Marlen Moody: 8/23/2022 7:40 PM EDT  Subject: Visit on 9/12    Good evening Dr. Christy Quarles:    I don't see where I have an appointment for lab work prior to my visit. I just wanted to know if I need to schedule one, or will you request lab work on that day. So used to having labs week before visit.     Thank you    Booker Law

## 2022-08-24 NOTE — PERIOP NOTE
Patient verified name and . Order for consent NOT found in EHR at time of PAT visit. Unable to verify case posting against order; surgery verified by patient. Type 1A surgery, phone assessment complete. Orders not received. Labs per surgeon: none ordered  Labs per anesthesia protocol: SQBS s/h for DOS    Patient answered medical/surgical history questions at their best of ability. All prior to admission medications documented in [facility]. Patient instructed to take the following medications the day of surgery according to anesthesia guidelines with a small sip of water: flonase if needed, levothyroxine. Hold all vitamins and supplements now for surgery and NSAIDS now for surgery. Prescription meds to hold: none      Patient instructed on the following:  > Bring albuterol inhaler to the hospital  > Arrive at [facility], time of arrival to be called the day before by 1700  > NPO after midnight, unless otherwise indicated, including gum, mints, and ice chips  > Responsible adult must drive patient to the hospital, stay during surgery, and patient will need supervision 24 hours after anesthesia  > Use antibacterial soap in shower the night before surgery and on the morning of surgery  > All piercings must be removed prior to arrival.    > Leave all valuables (money and jewelry) at home but bring insurance card and ID on DOS.   > You may be required to pay a deductible or co-pay on the day of your procedure. You can pre-pay by calling [phone] if your surgery is at the [facility] or [phone] if your surgery is at the [facility]. > Do not wear make-up, nail polish, lotions, cologne, perfumes, powders, or oil on skin. Artificial nails are not permitted.      Patient verbalized understandnig

## 2022-08-25 ENCOUNTER — ANESTHESIA EVENT (OUTPATIENT)
Dept: SURGERY | Age: 62
End: 2022-08-25
Payer: OTHER GOVERNMENT

## 2022-08-25 ENCOUNTER — PREP FOR PROCEDURE (OUTPATIENT)
Dept: ORTHOPEDIC SURGERY | Age: 62
End: 2022-08-25

## 2022-08-25 RX ORDER — SODIUM CHLORIDE 9 MG/ML
INJECTION, SOLUTION INTRAVENOUS PRN
Status: CANCELLED | OUTPATIENT
Start: 2022-08-25

## 2022-08-25 RX ORDER — SODIUM CHLORIDE 0.9 % (FLUSH) 0.9 %
5-40 SYRINGE (ML) INJECTION EVERY 12 HOURS SCHEDULED
Status: CANCELLED | OUTPATIENT
Start: 2022-08-25

## 2022-08-25 RX ORDER — SODIUM CHLORIDE 0.9 % (FLUSH) 0.9 %
5-40 SYRINGE (ML) INJECTION PRN
Status: CANCELLED | OUTPATIENT
Start: 2022-08-25

## 2022-08-26 ENCOUNTER — HOSPITAL ENCOUNTER (OUTPATIENT)
Age: 62
Setting detail: OUTPATIENT SURGERY
Discharge: HOME | End: 2022-08-26
Attending: ORTHOPAEDIC SURGERY | Admitting: ORTHOPAEDIC SURGERY
Payer: OTHER GOVERNMENT

## 2022-08-26 ENCOUNTER — ANESTHESIA (OUTPATIENT)
Dept: SURGERY | Age: 62
End: 2022-08-26
Payer: OTHER GOVERNMENT

## 2022-08-26 VITALS
TEMPERATURE: 97.3 F | OXYGEN SATURATION: 98 % | RESPIRATION RATE: 16 BRPM | DIASTOLIC BLOOD PRESSURE: 74 MMHG | HEART RATE: 66 BPM | WEIGHT: 132 LBS | BODY MASS INDEX: 24.29 KG/M2 | HEIGHT: 62 IN | SYSTOLIC BLOOD PRESSURE: 137 MMHG

## 2022-08-26 DIAGNOSIS — G56.02 LEFT CARPAL TUNNEL SYNDROME: ICD-10-CM

## 2022-08-26 LAB
GLUCOSE BLD STRIP.AUTO-MCNC: 102 MG/DL (ref 65–100)
SERVICE CMNT-IMP: ABNORMAL

## 2022-08-26 PROCEDURE — 3600000002 HC SURGERY LEVEL 2 BASE: Performed by: ORTHOPAEDIC SURGERY

## 2022-08-26 PROCEDURE — 7100000010 HC PHASE II RECOVERY - FIRST 15 MIN: Performed by: ORTHOPAEDIC SURGERY

## 2022-08-26 PROCEDURE — 2500000003 HC RX 250 WO HCPCS: Performed by: ORTHOPAEDIC SURGERY

## 2022-08-26 PROCEDURE — 2500000003 HC RX 250 WO HCPCS: Performed by: NURSE ANESTHETIST, CERTIFIED REGISTERED

## 2022-08-26 PROCEDURE — 6370000000 HC RX 637 (ALT 250 FOR IP): Performed by: ANESTHESIOLOGY

## 2022-08-26 PROCEDURE — 6360000002 HC RX W HCPCS: Performed by: ORTHOPAEDIC SURGERY

## 2022-08-26 PROCEDURE — 3600000012 HC SURGERY LEVEL 2 ADDTL 15MIN: Performed by: ORTHOPAEDIC SURGERY

## 2022-08-26 PROCEDURE — 64721 CARPAL TUNNEL SURGERY: CPT | Performed by: ORTHOPAEDIC SURGERY

## 2022-08-26 PROCEDURE — 6360000002 HC RX W HCPCS: Performed by: NURSE ANESTHETIST, CERTIFIED REGISTERED

## 2022-08-26 PROCEDURE — 7100000011 HC PHASE II RECOVERY - ADDTL 15 MIN: Performed by: ORTHOPAEDIC SURGERY

## 2022-08-26 PROCEDURE — 76998 US GUIDE INTRAOP: CPT | Performed by: ORTHOPAEDIC SURGERY

## 2022-08-26 PROCEDURE — 3700000000 HC ANESTHESIA ATTENDED CARE: Performed by: ORTHOPAEDIC SURGERY

## 2022-08-26 PROCEDURE — 2720000010 HC SURG SUPPLY STERILE: Performed by: ORTHOPAEDIC SURGERY

## 2022-08-26 PROCEDURE — 7100000000 HC PACU RECOVERY - FIRST 15 MIN: Performed by: ORTHOPAEDIC SURGERY

## 2022-08-26 PROCEDURE — 2709999900 HC NON-CHARGEABLE SUPPLY: Performed by: ORTHOPAEDIC SURGERY

## 2022-08-26 PROCEDURE — 2580000003 HC RX 258: Performed by: ANESTHESIOLOGY

## 2022-08-26 PROCEDURE — 82962 GLUCOSE BLOOD TEST: CPT

## 2022-08-26 PROCEDURE — 3700000001 HC ADD 15 MINUTES (ANESTHESIA): Performed by: ORTHOPAEDIC SURGERY

## 2022-08-26 RX ORDER — SODIUM CHLORIDE, SODIUM LACTATE, POTASSIUM CHLORIDE, CALCIUM CHLORIDE 600; 310; 30; 20 MG/100ML; MG/100ML; MG/100ML; MG/100ML
INJECTION, SOLUTION INTRAVENOUS CONTINUOUS
Status: DISCONTINUED | OUTPATIENT
Start: 2022-08-26 | End: 2022-08-26 | Stop reason: HOSPADM

## 2022-08-26 RX ORDER — SODIUM CHLORIDE 0.9 % (FLUSH) 0.9 %
5-40 SYRINGE (ML) INJECTION PRN
Status: DISCONTINUED | OUTPATIENT
Start: 2022-08-26 | End: 2022-08-26 | Stop reason: HOSPADM

## 2022-08-26 RX ORDER — HYDROMORPHONE HYDROCHLORIDE 2 MG/ML
0.25 INJECTION, SOLUTION INTRAMUSCULAR; INTRAVENOUS; SUBCUTANEOUS EVERY 5 MIN PRN
Status: DISCONTINUED | OUTPATIENT
Start: 2022-08-26 | End: 2022-08-26 | Stop reason: HOSPADM

## 2022-08-26 RX ORDER — SODIUM CHLORIDE 0.9 % (FLUSH) 0.9 %
5-40 SYRINGE (ML) INJECTION EVERY 12 HOURS SCHEDULED
Status: DISCONTINUED | OUTPATIENT
Start: 2022-08-26 | End: 2022-08-26 | Stop reason: HOSPADM

## 2022-08-26 RX ORDER — LIDOCAINE HYDROCHLORIDE 20 MG/ML
INJECTION, SOLUTION EPIDURAL; INFILTRATION; INTRACAUDAL; PERINEURAL PRN
Status: DISCONTINUED | OUTPATIENT
Start: 2022-08-26 | End: 2022-08-26 | Stop reason: SDUPTHER

## 2022-08-26 RX ORDER — DIPHENHYDRAMINE HYDROCHLORIDE 50 MG/ML
12.5 INJECTION INTRAMUSCULAR; INTRAVENOUS
Status: DISCONTINUED | OUTPATIENT
Start: 2022-08-26 | End: 2022-08-26 | Stop reason: HOSPADM

## 2022-08-26 RX ORDER — HYDROCODONE BITARTRATE AND ACETAMINOPHEN 5; 325 MG/1; MG/1
1 TABLET ORAL EVERY 6 HOURS PRN
Qty: 10 TABLET | Refills: 0 | Status: SHIPPED | OUTPATIENT
Start: 2022-08-26 | End: 2022-08-29

## 2022-08-26 RX ORDER — BUPIVACAINE HYDROCHLORIDE 2.5 MG/ML
INJECTION, SOLUTION EPIDURAL; INFILTRATION; INTRACAUDAL PRN
Status: DISCONTINUED | OUTPATIENT
Start: 2022-08-26 | End: 2022-08-26 | Stop reason: ALTCHOICE

## 2022-08-26 RX ORDER — PROPOFOL 10 MG/ML
INJECTION, EMULSION INTRAVENOUS PRN
Status: DISCONTINUED | OUTPATIENT
Start: 2022-08-26 | End: 2022-08-26 | Stop reason: SDUPTHER

## 2022-08-26 RX ORDER — ACETAMINOPHEN 500 MG
1000 TABLET ORAL ONCE
Status: COMPLETED | OUTPATIENT
Start: 2022-08-26 | End: 2022-08-26

## 2022-08-26 RX ORDER — OXYCODONE HYDROCHLORIDE 5 MG/1
10 TABLET ORAL PRN
Status: DISCONTINUED | OUTPATIENT
Start: 2022-08-26 | End: 2022-08-26 | Stop reason: HOSPADM

## 2022-08-26 RX ORDER — HYDROMORPHONE HYDROCHLORIDE 2 MG/ML
0.5 INJECTION, SOLUTION INTRAMUSCULAR; INTRAVENOUS; SUBCUTANEOUS EVERY 10 MIN PRN
Status: DISCONTINUED | OUTPATIENT
Start: 2022-08-26 | End: 2022-08-26 | Stop reason: HOSPADM

## 2022-08-26 RX ORDER — OXYCODONE HYDROCHLORIDE 5 MG/1
5 TABLET ORAL PRN
Status: DISCONTINUED | OUTPATIENT
Start: 2022-08-26 | End: 2022-08-26 | Stop reason: HOSPADM

## 2022-08-26 RX ORDER — MIDAZOLAM HYDROCHLORIDE 2 MG/2ML
2 INJECTION, SOLUTION INTRAMUSCULAR; INTRAVENOUS
Status: DISCONTINUED | OUTPATIENT
Start: 2022-08-26 | End: 2022-08-26 | Stop reason: HOSPADM

## 2022-08-26 RX ORDER — LIDOCAINE HYDROCHLORIDE 10 MG/ML
INJECTION, SOLUTION INFILTRATION; PERINEURAL PRN
Status: DISCONTINUED | OUTPATIENT
Start: 2022-08-26 | End: 2022-08-26 | Stop reason: ALTCHOICE

## 2022-08-26 RX ORDER — FENTANYL CITRATE 50 UG/ML
100 INJECTION, SOLUTION INTRAMUSCULAR; INTRAVENOUS
Status: DISCONTINUED | OUTPATIENT
Start: 2022-08-26 | End: 2022-08-26 | Stop reason: HOSPADM

## 2022-08-26 RX ORDER — ONDANSETRON 2 MG/ML
4 INJECTION INTRAMUSCULAR; INTRAVENOUS
Status: DISCONTINUED | OUTPATIENT
Start: 2022-08-26 | End: 2022-08-26 | Stop reason: HOSPADM

## 2022-08-26 RX ADMIN — Medication 2000 MG: at 07:16

## 2022-08-26 RX ADMIN — LIDOCAINE HYDROCHLORIDE 40 MG: 20 INJECTION, SOLUTION EPIDURAL; INFILTRATION; INTRACAUDAL; PERINEURAL at 07:22

## 2022-08-26 RX ADMIN — SODIUM CHLORIDE, SODIUM LACTATE, POTASSIUM CHLORIDE, AND CALCIUM CHLORIDE: 600; 310; 30; 20 INJECTION, SOLUTION INTRAVENOUS at 06:34

## 2022-08-26 RX ADMIN — PROPOFOL 10 MG: 10 INJECTION, EMULSION INTRAVENOUS at 07:26

## 2022-08-26 RX ADMIN — PROPOFOL 20 MG: 10 INJECTION, EMULSION INTRAVENOUS at 07:32

## 2022-08-26 RX ADMIN — PROPOFOL 20 MG: 10 INJECTION, EMULSION INTRAVENOUS at 07:29

## 2022-08-26 RX ADMIN — ACETAMINOPHEN 1000 MG: 500 TABLET, FILM COATED ORAL at 06:33

## 2022-08-26 RX ADMIN — PROPOFOL 20 MG: 10 INJECTION, EMULSION INTRAVENOUS at 07:24

## 2022-08-26 RX ADMIN — PROPOFOL 60 MG: 10 INJECTION, EMULSION INTRAVENOUS at 07:22

## 2022-08-26 NOTE — ANESTHESIA PRE PROCEDURE
Department of Anesthesiology  Preprocedure Note       Name:  Chacorta Cuellar   Age:  65 y.o.  :  1960                                          MRN:  [id number]         Date:  2022      Surgeon: Kyrie López):  Paris Ang MD    Procedure: Procedure(s):  Left ultrasound guided carpal tunnel release; 71035, 03618, local with MAC    Medications prior to admission:   Prior to Admission medications    Medication Sig Start Date End Date Taking? Authorizing Provider   Multiple Vitamins-Minerals (PRESERVISION AREDS PO) Take by mouth daily   Yes Historical Provider, MD   chlorhexidine (PERIDEX) 0.12 % solution daily 22   Historical Provider, MD   buPROPion (WELLBUTRIN XL) 300 MG extended release tablet Take 1 tablet by mouth every morning  Patient taking differently: Take 300 mg by mouth daily Takes with food 6/15/22 9/13/22  Kaci Tanner MD   metFORMIN (GLUCOPHAGE) 500 MG tablet Take one tablet a day with evening meal 6/15/22   Kaci Tanner MD   albuterol sulfate  (90 Base) MCG/ACT inhaler USE 2 INHALATIONS EVERY 6 HOURS AS NEEDED FOR WHEEZING 21   Ar Automatic Reconciliation   atorvastatin (LIPITOR) 10 MG tablet Take 10 mg by mouth nightly 21   Ar Automatic Reconciliation   fluticasone (FLONASE) 50 MCG/ACT nasal spray 2 sprays by Nasal route daily as needed 21   Ar Automatic Reconciliation   levothyroxine (SYNTHROID) 88 MCG tablet Take 88 mcg by mouth every morning (before breakfast) 22   Ar Automatic Reconciliation   SUMAtriptan-Naproxen Sodium (TREXIMET)  MG TABS tablet Take 1 Tab by mouth once as needed.  May repeat 2 hours after first dose if symptoms persisit Indications: a migraine headache 21   Ar Automatic Reconciliation   vilazodone HCl (VIIBRYD) 40 MG TABS Take 40 mg by mouth daily Takes with food 11/15/21   Ar Automatic Reconciliation       Current medications:    Current Facility-Administered Medications   Medication Dose Route Frequency Provider Last Rate Last Admin    fentaNYL (SUBLIMAZE) injection 100 mcg  100 mcg IntraVENous Once PRN Lm Maldonado MD        lactated ringers infusion   IntraVENous Continuous Lm Maldonado  mL/hr at 08/26/22 0634 New Bag at 08/26/22 0634    sodium chloride flush 0.9 % injection 5-40 mL  5-40 mL IntraVENous 2 times per day Lm Maldonado MD        sodium chloride flush 0.9 % injection 5-40 mL  5-40 mL IntraVENous PRN Lm Maldonado MD        midazolam PF (VERSED) injection 2 mg  2 mg IntraVENous Once PRN Lm Maldonado MD        ceFAZolin (ANCEF) 2000 mg in sterile water 20 mL IV syringe  2,000 mg IntraVENous On Call to Shelbi Saba MD        sodium chloride flush 0.9 % injection 5-40 mL  5-40 mL IntraVENous 2 times per day Leo Dumas MD        sodium chloride flush 0.9 % injection 5-40 mL  5-40 mL IntraVENous PRN Leo Dumas MD           Allergies:     Allergies   Allergen Reactions    Penicillins Hives, Rash and Other (See Comments)       Problem List:    Patient Active Problem List   Diagnosis Code    Abnormal LFTs R94.5    Seasonal allergic rhinitis J30.2    Dyspareunia, female N94.10    Carpal tunnel syndrome, bilateral G56.03    Pulmonary nodule R91.1    Basal cell carcinoma C44.91    Hand lesion L98.9    Hepatic steatosis determined by biopsy of liver K76.0    Type 2 diabetes mellitus with hyperglycemia, without long-term current use of insulin ([address] E11.65    Diabetes mellitus type 2, diet-controlled ([address] E11.9    Migraine without aura and without status migrainosus, not intractable G43.009    Hyperlipidemia associated with type 2 diabetes mellitus (HCC) E11.69, E78.5    Hypothyroidism (acquired) E03.9    History of hyperthyroidism Z86.39    Prediabetes R73.03    History of Graves' disease Z86.39    Tight introitus N89.6    Hypothyroidism following radioiodine therapy E89.0    Mild intermittent asthma without complication J45.20    Paresthesia R20.2    Depressive disorder F32.9    Left carpal tunnel syndrome G56.02       Past Medical History:        Diagnosis Date    Arthritis     Asthma     exercise induced asthma - uses albuterol for wheezing as needed     Cancer ([facility]     skin cancer - nose    Diabetes ([facility] [address] [facility]     oral agents, FBS ; denies S&S of hypoglycemia; A1c 5.9 3/8/22    External hemorrhoids     History of Graves' disease     treated, takes synthroid    Hyperlipidemia     atorvastatin    Hypertension     no medications taken at this time     Hypothyroidism following radioiodine therapy     levothyroxine    Liver disease     fatty liver     Migraines     Prediabetes     Psychiatric disorder     depression and anxiety , medication       Past Surgical History:        Procedure Laterality Date    BREAST BIOPSY      3 times    CARPAL TUNNEL RELEASE Right 2014     SECTION  10/1987, 1990    MOHS SURGERY      2016    OTHER SURGICAL HISTORY      hates needles and gets anxious before IV start     PARTIAL HYSTERECTOMY (CERVIX NOT REMOVED)  2000    still has ovaries    TONSILLECTOMY  as a child       Social History:    Social History     Tobacco Use    Smoking status: Never    Smokeless tobacco: Never   Substance Use Topics    Alcohol use:  Yes     Alcohol/week: 2.0 standard drinks     Types: 2 Glasses of wine per week                                Counseling given: Not Answered      Vital Signs (Current):   Vitals:    22 0933 22 0551   BP:  125/73   Pulse:  87   Resp:  18   Temp:  98.3 °F (36.8 °C)   TempSrc:  Oral   SpO2:  98%   Weight: 132 lb (59.9 kg) 132 lb (59.9 kg)   Height: 5' 2\" (1.575 m)                                               BP Readings from Last 3 Encounters:   22 125/73   22 113/79   22 122/76       NPO Status: Time of last liquid consumption:                         Time of last solid consumption:                         Date of last liquid consumption: 08/25/22                        Date of last solid food consumption: 08/25/22    BMI:   Wt Readings from Last 3 Encounters:   08/26/22 132 lb (59.9 kg)   08/08/22 132 lb (59.9 kg)   08/03/22 133 lb (60.3 kg)     Body mass index is 24.14 kg/m².     CBC:   Lab Results   Component Value Date/Time    WBC 7.5 03/08/2022 09:18 AM    RBC 4.51 03/08/2022 09:18 AM    HGB 13.7 03/08/2022 09:18 AM    HCT 41.5 03/08/2022 09:18 AM    MCV 92 03/08/2022 09:18 AM    RDW 11.8 03/08/2022 09:18 AM     03/08/2022 09:18 AM       CMP:   Lab Results   Component Value Date/Time     03/08/2022 09:18 AM    K 4.8 03/08/2022 09:18 AM     03/08/2022 09:18 AM    CO2 23 03/08/2022 09:18 AM    BUN 20 03/08/2022 09:18 AM    CREATININE 0.82 03/08/2022 09:18 AM    GFRAA 104 09/09/2021 09:04 AM    AGRATIO 1.6 03/08/2022 09:18 AM    GLUCOSE 101 03/08/2022 09:18 AM    PROT 7.1 03/08/2022 09:18 AM    CALCIUM 9.8 03/08/2022 09:18 AM    BILITOT 0.4 03/08/2022 09:18 AM    ALKPHOS 53 03/08/2022 09:18 AM    AST 17 03/08/2022 09:18 AM    ALT 21 03/08/2022 09:18 AM       POC Tests:   Recent Labs     08/26/22  0608   POCGLU 102*       Coags: No results found for: PROTIME, INR, APTT    HCG (If Applicable): No results found for: PREGTESTUR, PREGSERUM, HCG, HCGQUANT     ABGs: No results found for: PHART, PO2ART, WFJ2QFH, YJX8FPZ, BEART, U1BVWIFN     Type & Screen (If Applicable):  No results found for: LABABO, LABRH    Drug/Infectious Status (If Applicable):  No results found for: HIV, HEPCAB    COVID-19 Screening (If Applicable): No results found for: COVID19        Anesthesia Evaluation  Patient summary reviewed and Nursing notes reviewed  Airway: Mallampati: II  TM distance: >3 FB   Neck ROM: full     Dental: normal exam         Pulmonary: breath sounds clear to auscultation  (+) asthma:                            Cardiovascular:  Exercise tolerance: good (>4 METS),   (+) hypertension:, hyperlipidemia        Rhythm: regular  Rate: normal                    Neuro/Psych:   (+) headaches:, psychiatric history:            GI/Hepatic/Renal:        Liver disease: fatty. Endo/Other:    (+) DiabetesType II DM, , hypothyroidism::., .                  ROS comment: Hx Graves Abdominal:             Vascular: Other Findings:           Anesthesia Plan      TIVA     ASA 2       Induction: intravenous. Anesthetic plan and risks discussed with patient.                         Ginger Diop MD   8/26/2022

## 2022-08-26 NOTE — ANESTHESIA POSTPROCEDURE EVALUATION
Department of Anesthesiology  Postprocedure Note    Patient: Josep Bhandari  MRN: [id number]  YOB: 1960  Date of evaluation: 8/26/2022      Procedure Summary     Date: 08/26/22 Room / Location: [facility] OP OR 02 / [facility]    Anesthesia Start: 2281 Anesthesia Stop: 4550    Procedure: Left ultrasound guided carpal tunnel release; 54837, 17666, local with MAC (Left: Hand) Diagnosis:       Left carpal tunnel syndrome      (Left carpal tunnel syndrome [G56.02])    Surgeons: Le Layne MD Responsible Provider: Karime Garcia MD    Anesthesia Type: TIVA ASA Status: 2          Anesthesia Type: No value filed.     George Phase I: George Score: 8    George Phase II:        Anesthesia Post Evaluation    Patient location during evaluation: PACU  Patient participation: complete - patient participated  Level of consciousness: awake and alert  Airway patency: patent  Nausea & Vomiting: no nausea  Cardiovascular status: hemodynamically stable  Respiratory status: acceptable  Hydration status: euvolemic

## 2022-08-26 NOTE — DISCHARGE INSTRUCTIONS
Hand Surgery Postoperative  Instructions:      Weightbearing or Lifting:  Limit  weight  lifting  to  less  than  1  pound  (coffee  mug)  for  the  first  2  weeks  after  surgery. Dressing  instructions:    Keep  your  dressing  and/or  splint  clean  and  dry  at  all  times. You  can  remove  your  dressing  on  post-operative  day  #7  and  change  with  a  dry/sterile  dressing  or  Band-Aids  as  needed  thereafter. Showering  Instructions:  May  shower  But keep surgical dressing clean and dry until removed as explained above. After dressing is removed, you may allow soapy water to run through the incision during showers but do not scrub. After each shower, pat dry and apply a dry dressing. Do  not  soak  your  Incision in still water or bathtub  for  3  weeks  after  surgery. If  the  incision  gets  wet otherwise,  pat  dry  and  do  not  scrub  the  incision. Do  not  apply  cream  or  lotion  to  incision      Pain  Control:  - You  have  been  given  a  prescription  to  be  taken  as  directed  for  post-operative  pain  control. In  addition,  elevate  the  operative  extremity  above  the  heart  at  all  times  to  prevent  swelling  and  throbbing  pain. - If you develop constipation while taking narcotic pain medications (Norco, Hydrocodone, Percocet, Oxycodone, Dilaudid, Hydromorphone) take  over-the-counter  Colace,  100mg  by  mouth  twice  a  Day. - Nausea  is  a  common  side  effect  of  many  pain  medications. You  will  want  to  eat something  before  taking  your  pain  medicine  to  help  prevent  Nausea. - If  you  are  taking  a  prescription  pain  medication  that  contains  acetaminophen,  we  recommend  that  you  do  not  take  additional  over  the  counter  acetaminophen  (Tylenol®).       Other  pain  relieving  options:   - Using  a  cold  pack  to  ice  the  affected  area  a  few  times  a  day  (15  to  20  minutes  at  a  time)  can help  to  relieve  pain,  reduce  swelling  and  bruising.      - Elevation  of  the  affected  area  can  also  help  to  reduce  pain  and  swelling. Did  you  receive  a  nerve  Block? A  nerve  block  can  provide  pain  relief  for  one  hour  to  two  days  after  your  surgery. As  long  as  the  nerve  block  is  working,  you  will  experience  little  or  no  sensation  in  the  area  the  surgeon  operated  on. As  the  nerve  block  wears  off,  you  will  begin  to  experience  pain  or  discomfort. It  is  very  important  that  you  begin  taking  your  prescribed  pain  medication  before  the  nerve  block  fully  wears  off. The first sign that the nerve block is wearing off is tingling in your fingers. Treating  your  pain  at  the  first  sign  of  the  block  wearing  off  will  ensure  your  pain  is  better  controlled  and  more  tolerable  when  full-sensation  returns. Do  not  wait  until  the  pain  is  intolerable,  as  the  medicine  will  be  less  effective. It  is  better  to  treat  pain  in  advance  than  to  try  and  catch  up. General  Anesthesia or Sedation:      If  you  did  not  receive  a  nerve  block  during  your  surgery,  you  will  need  to  start  taking  your  pain  medication  shortly  after  your  surgery  and  should  continue  to  do  so  as  prescribed  by  your  surgeon. Please  call  [phone]  with any concern and ask to speak with Cuba Mcdonald. Concerning problems include:      -  Excessive  redness  of  the  incisions      -  Drainage  for  more  than  2  Days after surgery or any foul smelling drainage  -  Fever  of  more  than  101.5  F      Please  call  [phone]  if  you  do  not  receive  or  are  unsure  of  your  first  follow-up  appointment. You  should  see  the  doctor  10-14  days  after  your  Surgery. Thank you for choosing me and [address] [place] [address] for your care.  I will go above and beyond to ensure you receive the best care possible. Errol Crook MD      MEDICATION INTERACTION:  During your procedure you potentially received a medication or medications which may reduce the effectiveness of oral contraceptives. Please consider other forms of contraception for 1 month following your procedure if you are currently using oral contraceptives as your primary form of birth control. In addition to this, we recommend continuing your oral contraceptive as prescribed, unless otherwise instructed by your physician, during this time    After general anesthesia or intravenous sedation, for 24 hours or while taking prescription Narcotics:  Limit your activities  A responsible adult needs to be with you for the next 24 hours  Do not drive and operate hazardous machinery  Do not make important personal or business decisions  Do not drink alcoholic beverages  If you have not urinated within 8 hours after discharge, and you are experiencing discomfort from urinary retention, please go to the nearest ED. If you have sleep apnea and have a CPAP machine, please use it for all naps and sleeping. Please use caution when taking narcotics and any of your home medications that may cause drowsiness. *  Please give a list of your current medications to your Primary Care Provider. *  Please update this list whenever your medications are discontinued, doses are      changed, or new medications (including over-the-counter products) are added. *  Please carry medication information at all times in case of emergency situations. These are general instructions for a healthy lifestyle:  No smoking/ No tobacco products/ Avoid exposure to second hand smoke  Surgeon General's Warning:  Quitting smoking now greatly reduces serious risk to your health.   Obesity, smoking, and sedentary lifestyle greatly increases your risk for illness  A healthy diet, regular physical exercise & weight monitoring are important for maintaining a healthy lifestyle    You may be retaining fluid if you have a history of heart failure or if you experience any of the following symptoms:  Weight gain of 3 pounds or more overnight or 5 pounds in a week, increased swelling in our hands or feet or shortness of breath while lying flat in bed. Please call your doctor as soon as you notice any of these symptoms; do not wait until your next office visit.

## 2022-08-26 NOTE — OP NOTE
Hand Surgery Operative Note      Kostas Roldan   65 y.o.   female      Pre-op diagnosis: Left Carpal Tunnel syndrome  Post op diagnosis: same      Procedure: Left Ultrasound-Guided Carpal Tunnel release cpt [id number], [id number]     Surgeon: Romina Craft MD     Anesthesia: Local + MAC      Tourniquet time: * No tourniquets in log *      Procedure indications: Patient with radial digit numbness recalcitrant to conservative measures with positive documentation of NCV findings consistent with carpal tunnel syndrome. After Thorough discussion, the patient decided to proceed with surgical management. We discussed in detail surgical risks including scar, pain, bleeding, infection, anesthetic risks, neurovascular injury, need for further surgery,  weakness, stiffness, risk of death and potential risk of other unforseen complication. Procedure description:      Patient was placed in the supine position and after appropriate time-out and side, site and procedure confirmed. Using a sterile ultrasound cover and sterile gel, relevant anatomical landmarks were identified, including but not limited to the median nerve, thenar motor branch/recurrent motor branch of the median nerve, palmar cutaneous branch of the median nerve, median and ulnar digital nerves and any visualized communications, ulnar vessels and superficial palmar arch, palmar fascia and distal transverse carpal ligament. A sterile ink marker was used to justus the borders of the transverse and longitudinal safe zones as well as the incision site in the proximal carpal tunnel region. The safe zones were re-scanned to ensure acceptable anatomy and sonographic visualization. A regional anesthetic was administered. A 25 gauge] needle was used to create a small skin wheal at the incision site using 2 ml of buffered 1% lidocaine plain.  Following this, under the guidance of ultrasound local anesthesia was delivered deep and superficial to the transverse carpal ligament using a 21-gauge 1.5 inch  and 10 ml of buffered 1% lidocaine plain,  the synovial tissue from the ligament. A #15 scalpel blade was used to create an approximately 5 mm longitudinal skin incision in the proximal carpal tunnel region. The fascia was visualized and incised. The transverse carpal ligament was visualized under ultrasound. A sterile stainless-steel dilator was then passed into the transverse safe zone to further free the synovial tissue from the  ligament and facilitate device insertion, the deep aspect of the transverse carpal ligament was scraped with the instrument until a washing board sensation was elicited. The SX-One MicroKnife was advanced into the carpal  tunnel and positioned within the transverse and longitudinal safe zones and confirmed to be in the appropriate position. The transverse carpal ligament, median nerve and distal branches,  flexor tendons, ulnar artery and superficial palmar arterial arch were visualized. Following satisfactory re-assessment, the cutting blade was engaged and the transverse carpal ligament divided in a retrograde manner to release pressure on the median nerve. The cutting blade was placed in its distal recessed position and a second pass of the knife was performed after transverse and longitudinal US assessment demonstrated that all structures were again in safe position. The blade was placed in its distal recessed position and removed. The transverse carpal ligament was probed using the stainless-steel dilator to ensure complete ligament transection and release of the median nerve from the ligament and adjacent synovial or scar tissue. The micro-knife was then inspected and confirmed to be intact. The carpal tunnel region was then re-scanned. The wound was irrigated with 5 ml of sterile normal saline. Excess fluid was manually expressed from the wound and the wound closed with Steri-Strips.  The wound was dressed with gauze and compressive wrapping. There were no immediate complications. Disposition: To PACU with no complications and follow up per routine. Patient is instructed to remove dressings in five days and other precautions include avoidance of heavy and repetitive lifting for 2 weeks, when an appointment for follow up and suture removal will take place.      Sivakumar Blue MD  08/26/22  7:46 AM

## 2022-09-06 ENCOUNTER — NURSE ONLY (OUTPATIENT)
Dept: INTERNAL MEDICINE CLINIC | Facility: CLINIC | Age: 62
End: 2022-09-06

## 2022-09-06 ENCOUNTER — OFFICE VISIT (OUTPATIENT)
Dept: ORTHOPEDIC SURGERY | Age: 62
End: 2022-09-06

## 2022-09-06 VITALS — HEIGHT: 62 IN | WEIGHT: 132 LBS | BODY MASS INDEX: 24.29 KG/M2

## 2022-09-06 DIAGNOSIS — G56.02 LEFT CARPAL TUNNEL SYNDROME: Primary | ICD-10-CM

## 2022-09-06 DIAGNOSIS — E11.65 TYPE 2 DIABETES MELLITUS WITH HYPERGLYCEMIA, WITHOUT LONG-TERM CURRENT USE OF INSULIN (HCC): ICD-10-CM

## 2022-09-06 LAB
ALBUMIN SERPL-MCNC: 3.8 G/DL (ref 3.2–4.6)
ALBUMIN/GLOB SERPL: 1.1 {RATIO} (ref 1.2–3.5)
ALP SERPL-CCNC: 66 U/L (ref 50–136)
ALT SERPL-CCNC: 34 U/L (ref 12–65)
ANION GAP SERPL CALC-SCNC: 5 MMOL/L (ref 4–13)
AST SERPL-CCNC: 15 U/L (ref 15–37)
BILIRUB SERPL-MCNC: 0.6 MG/DL (ref 0.2–1.1)
BUN SERPL-MCNC: 17 MG/DL (ref 8–23)
CALCIUM SERPL-MCNC: 9.2 MG/DL (ref 8.3–10.4)
CHLORIDE SERPL-SCNC: 107 MMOL/L (ref 101–110)
CO2 SERPL-SCNC: 28 MMOL/L (ref 21–32)
CREAT SERPL-MCNC: 0.9 MG/DL (ref 0.6–1)
ERYTHROCYTE [DISTWIDTH] IN BLOOD BY AUTOMATED COUNT: 12.1 % (ref 11.9–14.6)
GLOBULIN SER CALC-MCNC: 3.5 G/DL (ref 2.3–3.5)
GLUCOSE SERPL-MCNC: 105 MG/DL (ref 65–100)
HCT VFR BLD AUTO: 42.9 % (ref 35.8–46.3)
HGB BLD-MCNC: 14 G/DL (ref 11.7–15.4)
MCH RBC QN AUTO: 30.4 PG (ref 26.1–32.9)
MCHC RBC AUTO-ENTMCNC: 32.6 G/DL (ref 31.4–35)
MCV RBC AUTO: 93.1 FL (ref 79.6–97.8)
NRBC # BLD: 0 K/UL (ref 0–0.2)
PLATELET # BLD AUTO: 248 K/UL (ref 150–450)
PMV BLD AUTO: 10.3 FL (ref 9.4–12.3)
POTASSIUM SERPL-SCNC: 4.2 MMOL/L (ref 3.5–5.1)
PROT SERPL-MCNC: 7.3 G/DL (ref 6.3–8.2)
RBC # BLD AUTO: 4.61 M/UL (ref 4.05–5.2)
SODIUM SERPL-SCNC: 140 MMOL/L (ref 136–145)
WBC # BLD AUTO: 7 K/UL (ref 4.3–11.1)

## 2022-09-06 PROCEDURE — 99024 POSTOP FOLLOW-UP VISIT: CPT | Performed by: ORTHOPAEDIC SURGERY

## 2022-09-06 NOTE — PROGRESS NOTES
Orthopaedic Hand Surgery Note    Name: Lala Samuel  YOB: 1960  Gender: female  MRN: [id number]    HPI: Patient is status post Left ultrasound guided carpal tunnel release; 46602, 87576, local with MAC - Left on 8/26/2022. Patient reports sensation is improved. Night symptoms have resolved. No fevers or chills. Physical Examination:  Wound healing well. Good finger and wrist range of motion. Sensation is improved from preoperative. Assessment:   1. Left carpal tunnel syndrome        Status post Left ultrasound guided carpal tunnel release; 28538, 05884, local with MAC - Left on 8/26/2022    Plan:  We discussed that the hand can get wet uncovered in the shower but no soaking the hand for 2 more weeks. After washing the hand the incision needs to be patted dry and covered. She can advance activities as tolerated. . Patient will see me in 4 weeks for reevaluation.     Porsha Hester MD  Orthopaedic Surgery  09/06/22  1:45 PM

## 2022-09-07 LAB
EST. AVERAGE GLUCOSE BLD GHB EST-MCNC: 123 MG/DL
HBA1C MFR BLD: 5.9 % (ref 4.8–5.6)

## 2022-09-20 ENCOUNTER — OFFICE VISIT (OUTPATIENT)
Dept: INTERNAL MEDICINE CLINIC | Facility: CLINIC | Age: 62
End: 2022-09-20
Payer: OTHER GOVERNMENT

## 2022-09-20 VITALS
DIASTOLIC BLOOD PRESSURE: 78 MMHG | HEART RATE: 87 BPM | WEIGHT: 134 LBS | OXYGEN SATURATION: 98 % | HEIGHT: 62 IN | SYSTOLIC BLOOD PRESSURE: 118 MMHG | BODY MASS INDEX: 24.66 KG/M2

## 2022-09-20 DIAGNOSIS — E11.69 HYPERLIPIDEMIA ASSOCIATED WITH TYPE 2 DIABETES MELLITUS (HCC): ICD-10-CM

## 2022-09-20 DIAGNOSIS — J45.20 MILD INTERMITTENT ASTHMA, UNCOMPLICATED: ICD-10-CM

## 2022-09-20 DIAGNOSIS — E78.5 HYPERLIPIDEMIA ASSOCIATED WITH TYPE 2 DIABETES MELLITUS (HCC): ICD-10-CM

## 2022-09-20 DIAGNOSIS — Z12.31 ENCOUNTER FOR SCREENING MAMMOGRAM FOR BREAST CANCER: ICD-10-CM

## 2022-09-20 DIAGNOSIS — Z78.0 ENCOUNTER FOR OSTEOPOROSIS SCREENING IN ASYMPTOMATIC POSTMENOPAUSAL PATIENT: ICD-10-CM

## 2022-09-20 DIAGNOSIS — E11.9 TYPE 2 DIABETES MELLITUS WITHOUT COMPLICATION, WITHOUT LONG-TERM CURRENT USE OF INSULIN (HCC): Primary | ICD-10-CM

## 2022-09-20 DIAGNOSIS — Z78.0 POSTMENOPAUSAL: ICD-10-CM

## 2022-09-20 DIAGNOSIS — Z13.820 ENCOUNTER FOR OSTEOPOROSIS SCREENING IN ASYMPTOMATIC POSTMENOPAUSAL PATIENT: ICD-10-CM

## 2022-09-20 DIAGNOSIS — E03.9 HYPOTHYROIDISM, UNSPECIFIED TYPE: ICD-10-CM

## 2022-09-20 PROCEDURE — 3044F HG A1C LEVEL LT 7.0%: CPT | Performed by: INTERNAL MEDICINE

## 2022-09-20 PROCEDURE — 99214 OFFICE O/P EST MOD 30 MIN: CPT | Performed by: INTERNAL MEDICINE

## 2022-09-20 ASSESSMENT — PATIENT HEALTH QUESTIONNAIRE - PHQ9
1. LITTLE INTEREST OR PLEASURE IN DOING THINGS: 1
2. FEELING DOWN, DEPRESSED OR HOPELESS: 1
SUM OF ALL RESPONSES TO PHQ QUESTIONS 1-9: 2
SUM OF ALL RESPONSES TO PHQ9 QUESTIONS 1 & 2: 2
SUM OF ALL RESPONSES TO PHQ QUESTIONS 1-9: 2

## 2022-09-20 NOTE — PATIENT INSTRUCTIONS
Patient Education        Muscle Conditioning: Exercises  Introduction  Here are some examples of exercises for muscle conditioning. Start each exercise slowly. Ease off the exercise if you start to have pain. Your doctor or physical therapist will tell you when you can start these exercises and which ones will work best for you. How to do the exercises  Wall push-ups  When you can do this exercise against a wall comfortably (without your muscles feeling tired), you can try it against a counter. Start with 5 repetitions again and work up to 8 to 12. You can then slowly progress to the end of a couch or a sturdy chair, and finally to the floor. Stand facing a wall, about 12 to 18 inches away. Place your hands on the wall at shoulder height. Slowly bend your elbows and bring your face toward the wall, moving your hips and shoulders forward together. Push slowly back to the starting position. Start with 5 repetitions and work up to 8 to 12. Rest for a minute, and repeat the exercise. Knee extension  If this exercise becomes easy, you can add a light weight around your ankle or tie an elastic resistance band to a chair leg and one ankle. While sitting in a chair, straighten one leg and hold while you slowly count to 5. Be sure you do not lock your knee. Repeat 8 to 12 times. Rest for a minute, and repeat the exercise. Do the same exercise with the other leg. Side-lying leg lift  If this exercise becomes easy, you can add a light weight around your ankle or tie an elastic resistance band to both ankles. Lie on your side, with your legs extended. Keep your hips straight up and down during this exercise. Do not let your top hip rock toward the back. Support your head with your hand, and place the other hand on the floor near your waist.  Slowly raise your upper leg until it is about in line with your shoulder. Keep your toes pointed forward. Slowly lower your leg to the starting position.   Repeat 8 to 12 position. Repeat 8 to 12 times. Rest for a minute, and repeat the exercise. Lateral raise for the outer part of the shoulder (deltoid)  Stand with your feet shoulder-width apart and your knees slightly bent. Bend your arms 90 degrees with your elbows at hip level. With your palms facing in, hold the weights straight in front of you. Slowly lift the weights and your elbows out to the sides to shoulder level, keeping your elbows bent. Keep your shoulders down and relaxed as you lift. If you find you are shrugging your shoulders up toward your ears, your weights may be too heavy. Slowly lower the weights back to your sides. Repeat 8 to 12 times. Rest for a minute, and repeat the exercise. Biceps curls  Sit leaning forward with your legs slightly spread and your left hand on your left thigh. Hold the weight in your right hand, and place your right elbow on your right thigh. Slowly curl the weight up and toward your chest.  Slowly lower the weight to the original position. Repeat 8 to 12 times. Rest for a minute, and repeat the exercise. Do the same exercise with your other arm. Follow-up care is a key part of your treatment and safety. Be sure to make and go to all appointments, and call your doctor if you are having problems. It's also a good idea to know your test results and keep a list of the medicines you take. Current as of: January 26, 2022               Content Version: 13.4  © 2006-2022 [employer]. Care instructions adapted under license by [employer]. If you have questions about a medical condition or this instruction, always ask your healthcare professional. [place] [address] any warranty or liability for your use of this information. Patient Education        Resistance Training With Surgical Tubing: Exercises  Introduction  Here are some examples of exercises for resistance training. Start each exercise slowly.  Ease off the exercise if you start to have pain. Your doctor or physical therapist will tell you when you can start these exercises and which ones will work best for you. How to do the exercises  Side pull  Raise both arms overhead, palms of your hands facing forward. Pull one arm down and to the side, bending your elbow as shown, and hold. Slowly reach up again. Repeat with the other arm. Repeat 8 to 12 times with each hand. Rest for a minute, and repeat the exercise. Overhead pull  Raise both arms overhead, palms of your hands facing forward. Tighten the tubing by slowly pulling both arms away from center, and hold. Slowly return to the starting position with your arms straight up. Repeat 8 to 12 times. Rest for a minute, and repeat the exercise. Up-down pull  Raise both arms overhead. Bend your elbows so that they are shoulder height, and hold the stretched tubing behind or in front of your head. Slowly return to the starting position with your arms straight up. Repeat 8 to 12 times. Rest for a minute, and repeat the exercise. Chest-level pull  Raise your arms in front of you to chest level. Your elbows will be bent and held up at about shoulder height. Pull your hands apart, stretching the tubing, and hold. Try to keep your hands up at your chest level, and do not pull your shoulders up toward your ears. Slowly return to your starting position. Repeat 8 to 12 times. Rest for a minute, and repeat the exercise. Hip-level pull  Hold your hands at the level of your hips, or near your lap if you are sitting down. Pull your hands apart, stretching the tubing, and hold. Slowly return to your starting position. Repeat 8 to 12 times. Rest for a minute, and repeat the exercise. Follow-up care is a key part of your treatment and safety. Be sure to make and go to all appointments, and call your doctor if you are having problems. It's also a good idea to know your test results and keep a list of the medicines you take.   Current as of: January 26, 2022               Content Version: 13.4  © 6674-7626 [employer]. Care instructions adapted under license by [facility]. If you have questions about a medical condition or this instruction, always ask your healthcare professional. [address] any warranty or liability for your use of this information.

## 2022-09-20 NOTE — PROGRESS NOTES
9/20/2022    Chief Complaint   Patient presents with    Follow-up       Assessment and plan     1. Type 2 diabetes mellitus without complication, without long-term current use of insulin (HCC)  -     CBC with Auto Differential; Future  -     Comprehensive Metabolic Panel; Future  -     Hemoglobin A1C; Future  -     Lipid Panel; Future  -     AMB POC URINALYSIS DIP STICK AUTO W/O MICRO; Future  -     AMB POC URINE, MICROALBUMIN, SEMIQUANT (3 RESULTS); Future  2. Hyperlipidemia associated with type 2 diabetes mellitus ([address]  -     Lipid Panel; Future  3. Hypothyroidism, unspecified type  4. Mild intermittent asthma, uncomplicated  Comments:  Doing well. 5. Encounter for screening mammogram for breast cancer  -     LARRY JULIEN DIGITAL SCREEN BILATERAL; Future  6. Postmenopausal  -     DEXA BONE DENSITY AXIAL SKELETON; Future  7. Encounter for osteoporosis screening in asymptomatic postmenopausal patient  -     DEXA BONE DENSITY AXIAL SKELETON; Future     Patient seems to be doing well. Test results discussed today  Prescription management: No change in medications. Recommend :  She is reminded to follow recommended diet for diabetes mellitus and high cholesterol. She is encouraged to exercise especially free weights, balancing exercises and aerobic activity. Discussed health maintenance today. For mammogram and bone density test.     Follow up   Return in about 6 months (around 3/20/2023) for Chronic visit follow up, Fasting labs one week prior. Subjective              HPI:   Patient is here to follow up on Hypertension, Diabetes and Hypercholesterolemia. No symptoms or signs suggestive of uncontrolled diabetes mellitus, coronary artery disease or cerebrovascular disease.   Taking Medications as instructed  Last eye exam : a year ago - next week for next eye exam        Lab Results   Component Value Date    LABA1C 5.9 (H) 09/06/2022     Lab Results   Component Value Date     09/06/2022       Mood Doing well. Asthma  She has a history of asthma. No recent exacerbations. Thyroid   She is asymptomatic for thyroid disease. Did see endocrinologist in February 2023. Compliant with current management. Review of Systems  Using Tums more often Since she feels full easily . Gives no history of GERD. No abdominal pain nausea or vomiting. Objective     Examination  /78 (Site: Left Upper Arm, Position: Sitting, Cuff Size: Medium Adult)   Pulse 87   Ht 5' 2.4\" (1.585 m)   Wt 134 lb (60.8 kg)   SpO2 98%   BMI 24.19 kg/m²     Physical Exam  General appearance:Well looking , No distress Alert and oriented X 3. Well nourished and well hydrated  Head: Normocephalic, atraumatic  Eyes: conjunctivae clear. Neck: Supple, No carotid bruit, no thyromegaly, no adenopathy  Resp: normal effort. Chest clear  Heart: RRR. Normal heart sounds . Abdomen: Soft, non-tender, no masses , no organomegaly. Normal bowel sounds  Extremities: No edema  Neurology: no Focal deficits  Psychology: normal affect.  Mood is normal     Michael Saenz MD [id number]

## 2022-09-27 ENCOUNTER — HOSPITAL ENCOUNTER (OUTPATIENT)
Dept: MAMMOGRAPHY | Age: 62
Discharge: HOME | End: 2022-09-30
Payer: OTHER GOVERNMENT

## 2022-09-27 VITALS — HEIGHT: 63 IN | WEIGHT: 134 LBS | BODY MASS INDEX: 23.74 KG/M2

## 2022-09-27 DIAGNOSIS — Z78.0 POSTMENOPAUSAL: ICD-10-CM

## 2022-09-27 DIAGNOSIS — Z13.820 ENCOUNTER FOR OSTEOPOROSIS SCREENING IN ASYMPTOMATIC POSTMENOPAUSAL PATIENT: ICD-10-CM

## 2022-09-27 DIAGNOSIS — Z78.0 ENCOUNTER FOR OSTEOPOROSIS SCREENING IN ASYMPTOMATIC POSTMENOPAUSAL PATIENT: ICD-10-CM

## 2022-09-27 PROCEDURE — 77080 DXA BONE DENSITY AXIAL: CPT

## 2022-09-29 ENCOUNTER — PATIENT MESSAGE (OUTPATIENT)
Dept: ENDOCRINOLOGY | Age: 62
End: 2022-09-29

## 2022-09-29 ENCOUNTER — PATIENT MESSAGE (OUTPATIENT)
Dept: INTERNAL MEDICINE CLINIC | Facility: CLINIC | Age: 62
End: 2022-09-29

## 2022-09-29 ENCOUNTER — TELEPHONE (OUTPATIENT)
Dept: INTERNAL MEDICINE CLINIC | Facility: CLINIC | Age: 62
End: 2022-09-29

## 2022-09-29 DIAGNOSIS — G43.009 MIGRAINE WITHOUT AURA, NOT INTRACTABLE, WITHOUT STATUS MIGRAINOSUS: Primary | ICD-10-CM

## 2022-09-29 RX ORDER — SUMATRIPTAN AND NAPROXEN SODIUM 85; 500 MG/1; MG/1
TABLET, FILM COATED ORAL
Qty: 9 TABLET | Refills: 6 | Status: SHIPPED | OUTPATIENT
Start: 2022-09-29

## 2022-09-29 NOTE — TELEPHONE ENCOUNTER
From: Fanny Goins  To: Dr. Alvaro Gilbert: 9/29/2022 1:18 PM EDT  Subject: Prescription refill Treximet/ test results    I need to get a refill on my Treximet. While I have 4 refills left. [facility] that I need a new prescription. Could I please get a new prescription for the Sumatriptan Naproxen ( Treximet). Also, I see that my results are ready for my bone density scan. If you could please call to go over the results, I would appreciate it.     Thank you    Marti Briceno

## 2022-09-29 NOTE — TELEPHONE ENCOUNTER
From: Hannah Laird  To: Dr. Cisse Altavista: 9/29/2022 1:26 PM EDT  Subject: Synthroid 88 mcg    Good Afternoon Dr. Regla Castañeda:    I just need a clarification on if I should be taking a generic, or non generic. I requested a refill and was told that \"prescription was to fill as ordered\" - which is non generic. How ever up to this point they have been sending the generic. I don't care which version I take. If It is non-generic then that is fine. But if it should be generic, then I will need a new prescription sent to [facility]. Thank you for resolving .     Elliot Das

## 2022-10-04 ENCOUNTER — OFFICE VISIT (OUTPATIENT)
Dept: ORTHOPEDIC SURGERY | Age: 62
End: 2022-10-04

## 2022-10-04 DIAGNOSIS — G56.02 LEFT CARPAL TUNNEL SYNDROME: Primary | ICD-10-CM

## 2022-10-04 PROCEDURE — 99024 POSTOP FOLLOW-UP VISIT: CPT | Performed by: ORTHOPAEDIC SURGERY

## 2022-10-04 NOTE — PROGRESS NOTES
Orthopaedic Hand Surgery Note    Name: Carlos Ferguson  YOB: 1960  Gender: female  MRN: [id number]    HPI: Patient is status post Left ultrasound guided carpal tunnel release; 85092, 52892, local with MAC - Left on 8/26/2022. Patient reports sensation is improved. Night symptoms have resolved. Mild soreness at incision site, but has no difficulties with any ADLs    Physical Examination:  Wound healed well. Good finger and wrist range of motion. Sensation is improved from preoperative. Assessment:   1. Left carpal tunnel syndrome        Status post Left ultrasound guided carpal tunnel release; [id number], local with MAC - Left on 8/26/2022    Plan:  We discussed post operative course and progression. I have no activity restrictions for her at this time.  She can perform  strengthening and scar massage, and can follow up as needed    Jose De La Paz MD  Orthopaedic Surgery  10/04/22  10:37 AM

## 2022-10-04 NOTE — TELEPHONE ENCOUNTER
No formal results was also sent to the patient. Please call and let her know:    Bone density test shows that patient is osteopenic. Certain lifestyle changes will help slow the loss of bone density. Eat food that has plenty of calcium and vitamin D. Yogurt, cheese, milk, and dark green vegetables are high in calcium. Eggs, fatty fish, cereal, and fortified milk are high in vitamin D.  Get regular exercise. Do 30 minutes of weight-bearing exercise on most days of the week. Walking, jogging, stair climbing, and dancing are good choices. Do resistance exercises with weights or elastic bands 2 or 3 days a week. Limit alcohol to 2 drinks a day for men and 1 drink a day for women. Do not smoke.

## 2022-10-08 ENCOUNTER — PATIENT MESSAGE (OUTPATIENT)
Dept: INTERNAL MEDICINE CLINIC | Facility: CLINIC | Age: 62
End: 2022-10-08

## 2022-10-08 DIAGNOSIS — E11.9 TYPE 2 DIABETES MELLITUS WITHOUT COMPLICATION, WITHOUT LONG-TERM CURRENT USE OF INSULIN (HCC): Primary | ICD-10-CM

## 2022-10-08 DIAGNOSIS — J45.20 MILD INTERMITTENT ASTHMA WITHOUT COMPLICATION: ICD-10-CM

## 2022-10-10 RX ORDER — ALBUTEROL SULFATE 90 UG/1
2 AEROSOL, METERED RESPIRATORY (INHALATION) EVERY 6 HOURS PRN
Qty: 3 EACH | Refills: 0 | Status: SHIPPED | OUTPATIENT
Start: 2022-10-10

## 2022-10-10 RX ORDER — BLOOD-GLUCOSE METER
1 KIT MISCELLANEOUS DAILY
Qty: 100 EACH | Refills: 3 | Status: SHIPPED | OUTPATIENT
Start: 2022-10-10

## 2022-10-10 NOTE — TELEPHONE ENCOUNTER
From: Marcello Peck  To: Dr. Efraín Castillo: 10/8/2022 9:19 PM EDT  Subject: prescriptions are not listed    Margarito Garcia:    I need to get a refill on the following prescriptions and they are no longer listed in medication list.    Freestyle Lite Strips 50's    Proair Hfa Inh 8.5gm W/count    Thank you.     Jessee Newman

## 2022-10-10 NOTE — TELEPHONE ENCOUNTER
Requested Prescriptions     Pending Prescriptions Disp Refills    blood glucose test strips (FREESTYLE LITE) strip 100 each 3     Si each by In Vitro route daily Check BS once per day Indication: E11.9    albuterol sulfate HFA (PROAIR HFA) 108 (90 Base) MCG/ACT inhaler 3 each 0     Sig: Inhale 2 puffs into the lungs every 6 hours as needed for Wheezing     Dose verified and to [facility]

## 2022-11-28 LAB — MAMMOGRAPHY, EXTERNAL: NORMAL

## 2022-12-09 ENCOUNTER — PATIENT MESSAGE (OUTPATIENT)
Dept: INTERNAL MEDICINE CLINIC | Facility: CLINIC | Age: 62
End: 2022-12-09

## 2022-12-12 RX ORDER — VILAZODONE HYDROCHLORIDE 40 MG/1
40 TABLET ORAL DAILY
Qty: 90 TABLET | Refills: 1 | Status: SHIPPED | OUTPATIENT
Start: 2022-12-12

## 2022-12-12 NOTE — TELEPHONE ENCOUNTER
From: Yumiko Waters  To: Dr. Pisano Orf: 12/9/2022 4:57 PM EST  Subject: Refill on Marvine Sang Afternoon Dr. Mortimer Manuel:    I need to get a refill on my Viibryd, but I cannot request refill through my chart. If you would send a new prescription to [facility], I would appreciate it.     Thank you    Lali Rico

## 2023-01-05 ENCOUNTER — PATIENT MESSAGE (OUTPATIENT)
Dept: INTERNAL MEDICINE CLINIC | Facility: CLINIC | Age: 63
End: 2023-01-05

## 2023-01-06 RX ORDER — ATORVASTATIN CALCIUM 10 MG/1
10 TABLET, FILM COATED ORAL NIGHTLY
Qty: 90 TABLET | Refills: 1 | Status: SHIPPED | OUTPATIENT
Start: 2023-01-06

## 2023-01-06 NOTE — TELEPHONE ENCOUNTER
From: Olive Manjarrez  To: Dr. Ajay Rai: 1/5/2023 9:16 PM EST  Subject: Atorvastain refill    Good Morning Dr. Yamileth Tipton:    I need to get a refill on my Atorvastain. I have no refills left on the last prescriptions. Please send to [facility].     Thank you    Jackie Darby

## 2023-01-06 NOTE — TELEPHONE ENCOUNTER
Pt requesting refill of medication. Medication pended. Pt was seen in September. Dr. Bull [place] patient out of medication.

## 2023-01-26 DIAGNOSIS — E89.0 HYPOTHYROIDISM FOLLOWING RADIOIODINE THERAPY: ICD-10-CM

## 2023-01-27 LAB — TSH W FREE THYROID IF ABNORMAL: 1.31 UIU/ML (ref 0.36–3.74)

## 2023-01-31 NOTE — PROGRESS NOTES
Destiney Milian MD, [address] [place], [place] [address]            Reason for visit: History of hypothyroidism      ASSESSMENT AND PLAN:    1. Hypothyroidism following radioiodine therapy  Sinai Alvarez is biochemically euthyroid. Continue thyroid hormone replacement as prescribed. Residual symptoms are attributable to something other than thyroid dysfunction. Repeat thyroid function tests prior to the next appointment. - levothyroxine (SYNTHROID) 88 MCG tablet; Take 1 tablet by mouth every morning (before breakfast)  Dispense: 90 tablet; Refill: 3  - TSH; Future  - T4, Free; Future    2. History of hyperthyroidism    3. History of Graves' disease      Follow-up and Dispositions    Return in about 6 months (around 8/1/2023). History of Present Illness:    THYROID DYSFUNCTION  Sinai Alvarez is seen for follow-up of hypothyroidism. She was diagnosed with hyperthyroidism/Graves' disease in 1994 and was treated with radioactive iodine and rendered hypothyroid thereafter. Current symptoms:  See review of systems below     Prior treatment: She has been on levothyroxine or Synthroid since diagnosis.   The most recent therapy adjustments have been as follows:  -Synthroid 50 mcg daily to Synthroid 50 mcg daily 3 days/week and generic levothyroxine 75 mcg daily 4 days/week 3/15/2021  -Synthroid 88 mcg daily 4/28/2021     Pertinent labs:  11/6/2015: TSH 2.600.  3/10/2016: TSH 2.550.  8/11/2016: TSH 3.940.  4/24/2017: TSH 2.000.  9/26/2017: TSH 6.720.  11/21/2017: TSH 2.480.  3/15/2018: TSH 4.220.  9/18/2018: TSH 1.710.  1/21/2019: TSH 0.467.  4/18/2019: TSH 0.275.  6/27/2019: TSH 0.072, free T4 2.00.  8/28/2019: TSH 0.155.  10/22/2019: TSH 0.092.  5/27/2020: TSH 3.180.  7/21/2020: TSH 7.560.  9/10/2020: TSH 0.058.  11/20/2020: TSH 0.129, T4 10.3, free thyroxine index 3.3.  1/11/2021: TSH 0.150, T4 8.7, free thyroxine index 2.5, free T3 2.8.  3/8/2021: TSH 7.840, T4 7.4, free thyroxine index 1.9.  4/28/2021: TSH 7.380, free T4 1.1.  7/26/2021: TSH 1.230, free T4 1.1.  1/25/2022: TSH 0.514, free T4 2.16.  3/8/2022: TSH 1.100.  1/26/2023: TSH 1.31. Imaging: None available    Review of Systems   Constitutional:  Positive for fatigue and unexpected weight change (gained 2 pounds in 4-5 months). Cardiovascular:  Negative for palpitations. Gastrointestinal:  Negative for constipation and diarrhea. /78   Pulse 87   Ht 5' 3\" (1.6 m)   Wt 136 lb 3.2 oz (61.8 kg)   SpO2 99%   BMI 24.13 kg/m²   Wt Readings from Last 3 Encounters:   02/01/23 136 lb 3.2 oz (61.8 kg)   09/27/22 134 lb (60.8 kg)   09/20/22 134 lb (60.8 kg)       Physical Exam  HENT:      Head: Normocephalic. Neck:      Thyroid: No thyroid mass or thyromegaly. Cardiovascular:      Rate and Rhythm: Normal rate. Pulmonary:      Effort: No respiratory distress. Breath sounds: Normal breath sounds. Neurological:      Mental Status: She is alert.    Psychiatric:         Mood and Affect: Mood normal.         Behavior: Behavior normal.       Orders Placed This Encounter   Procedures    TSH     Standing Status:   Future     Standing Expiration Date:   2/1/2024    T4, Free     Standing Status:   Future     Standing Expiration Date:   2/1/2024         Current Outpatient Medications   Medication Sig Dispense Refill    levothyroxine (SYNTHROID) 88 MCG tablet Take 1 tablet by mouth every morning (before breakfast) 90 tablet 3    atorvastatin (LIPITOR) 10 MG tablet Take 1 tablet by mouth nightly 90 tablet 1    vilazodone HCl (VIIBRYD) 40 MG TABS Take 1 tablet by mouth daily 90 tablet 1    blood glucose test strips (FREESTYLE LITE) strip 1 each by In Vitro route daily Check BS once per day Indication: E11.9 100 each 3    albuterol sulfate HFA (PROAIR HFA) 108 (90 Base) MCG/ACT inhaler Inhale 2 puffs into the lungs every 6 hours as needed for Wheezing 3 each 0    SUMAtriptan-Naproxen Sodium (TREXIMET)  MG TABS tablet Take 1 Tab by mouth once as needed. May repeat 2 hours after first dose if symptoms persisit Indications: a migraine headache 9 tablet 6    chlorhexidine (PERIDEX) 0.12 % solution daily      metFORMIN (GLUCOPHAGE) 500 MG tablet Take one tablet a day with evening meal 90 tablet 3    albuterol sulfate  (90 Base) MCG/ACT inhaler USE 2 INHALATIONS EVERY 6 HOURS AS NEEDED FOR WHEEZING      fluticasone (FLONASE) 50 MCG/ACT nasal spray 2 sprays by Nasal route daily as needed      buPROPion (WELLBUTRIN XL) 300 MG extended release tablet Take 1 tablet by mouth every morning (Patient taking differently: Take 300 mg by mouth daily Takes with food) 90 tablet 3     No current facility-administered medications for this visit. Hair Lai MD, FACE      Portions of this note were generated with the assistance of voice recognition software. As such, some errors in transcription may be present.

## 2023-02-01 ENCOUNTER — OFFICE VISIT (OUTPATIENT)
Dept: ENDOCRINOLOGY | Age: 63
End: 2023-02-01
Payer: OTHER GOVERNMENT

## 2023-02-01 VITALS
OXYGEN SATURATION: 99 % | WEIGHT: 136.2 LBS | HEIGHT: 63 IN | HEART RATE: 87 BPM | DIASTOLIC BLOOD PRESSURE: 78 MMHG | SYSTOLIC BLOOD PRESSURE: 120 MMHG | BODY MASS INDEX: 24.13 KG/M2

## 2023-02-01 DIAGNOSIS — E89.0 HYPOTHYROIDISM FOLLOWING RADIOIODINE THERAPY: Primary | ICD-10-CM

## 2023-02-01 DIAGNOSIS — Z86.39 HISTORY OF HYPERTHYROIDISM: ICD-10-CM

## 2023-02-01 DIAGNOSIS — Z86.39 HISTORY OF GRAVES' DISEASE: ICD-10-CM

## 2023-02-01 PROCEDURE — 99213 OFFICE O/P EST LOW 20 MIN: CPT | Performed by: INTERNAL MEDICINE

## 2023-02-01 RX ORDER — LEVOTHYROXINE SODIUM 88 UG/1
88 TABLET ORAL
Qty: 90 TABLET | Refills: 3 | Status: SHIPPED | OUTPATIENT
Start: 2023-02-01

## 2023-02-01 ASSESSMENT — ENCOUNTER SYMPTOMS
DIARRHEA: 0
CONSTIPATION: 0

## 2023-02-13 ENCOUNTER — OFFICE VISIT (OUTPATIENT)
Dept: ORTHOPEDIC SURGERY | Age: 63
End: 2023-02-13
Payer: OTHER GOVERNMENT

## 2023-02-13 DIAGNOSIS — M20.11 HALLUX VALGUS OF RIGHT FOOT: ICD-10-CM

## 2023-02-13 DIAGNOSIS — M79.671 RIGHT FOOT PAIN: Primary | ICD-10-CM

## 2023-02-13 DIAGNOSIS — M77.41 METATARSALGIA OF RIGHT FOOT: ICD-10-CM

## 2023-02-13 PROCEDURE — 99203 OFFICE O/P NEW LOW 30 MIN: CPT | Performed by: ORTHOPAEDIC SURGERY

## 2023-02-13 NOTE — PROGRESS NOTES
Name: Taisha Powell  YOB: 1960  Gender: female  MRN: [id number]    Summary:     Right hallux valgus with second and third metatarsalgia     CC: New Patient (Right foot  x-rays taken in office today)       HPI: Taisha Powell is a 65 y.o. female who presents with New Patient (Right foot  x-rays taken in office today)  . This patient presents the office today with increased history of right forefoot pain. She did have an injury to her second toe on the side has a concern she had a fracture. History was obtained by Patient     ROS/Meds/PSH/PMH/FH/SH: I personally reviewed the patients standard intake form. Below are the pertinents    Tobacco:  reports that she has never smoked. She has never used smokeless tobacco.  Diabetes: None      Physical Examination:    Exam of the right foot and ankle shows a hallux valgus deformity. She has second and third toe MTP synovitis with mild hammertoes. She has palpable pulses. Imaging:   I independently interpreted XR taken today  Right foot XR: AP, Lateral, Oblique views     ICD-10-CM    1. Right foot pain  M79.671 XR FOOT RIGHT (MIN 3 VIEWS)      2. Metatarsalgia of right foot  M77.41       3. Hallux valgus of right foot  M20.11          Report: AP, lateral, oblique x-ray of the right foot demonstrates hallux valgus    Impression: Hallux valgus   Ashley Dumas III, MD           Assessment:   Right hallux valgus and second and third MTP synovitis with metatarsalgia and early hammertoe deformities    Treatment Plan:   4 This is a chronic illness/condition with exacerbation and progression  Treatment at this time: Time with no intervention  Studies ordered: NO XR needed @ Next Visit    Weight-bearing status: WBAT        Return to work/work restrictions: none  No medications given    We discussed etiology of the second and third toe overload from the bunion.   She is can try some Hapads metatarsal pads in an effort to avoid the expense of custom orthotics.

## 2023-03-15 DIAGNOSIS — E78.5 HYPERLIPIDEMIA ASSOCIATED WITH TYPE 2 DIABETES MELLITUS (HCC): ICD-10-CM

## 2023-03-15 DIAGNOSIS — E11.69 HYPERLIPIDEMIA ASSOCIATED WITH TYPE 2 DIABETES MELLITUS (HCC): ICD-10-CM

## 2023-03-15 DIAGNOSIS — E11.9 TYPE 2 DIABETES MELLITUS WITHOUT COMPLICATION, WITHOUT LONG-TERM CURRENT USE OF INSULIN (HCC): ICD-10-CM

## 2023-03-15 LAB
ALBUMIN SERPL-MCNC: 3.9 G/DL (ref 3.2–4.6)
ALBUMIN, URINE, POC: 10 MG/L
ALBUMIN/GLOB SERPL: 1.3 (ref 0.4–1.6)
ALP SERPL-CCNC: 51 U/L (ref 50–136)
ALT SERPL-CCNC: 22 U/L (ref 12–65)
ANION GAP SERPL CALC-SCNC: 1 MMOL/L (ref 2–11)
AST SERPL-CCNC: 15 U/L (ref 15–37)
BASOPHILS # BLD: 0.1 K/UL (ref 0–0.2)
BASOPHILS NFR BLD: 1 % (ref 0–2)
BILIRUB SERPL-MCNC: 0.5 MG/DL (ref 0.2–1.1)
BILIRUBIN, URINE, POC: NEGATIVE
BLOOD URINE, POC: NEGATIVE
BUN SERPL-MCNC: 15 MG/DL (ref 8–23)
CALCIUM SERPL-MCNC: 9.7 MG/DL (ref 8.3–10.4)
CHLORIDE SERPL-SCNC: 109 MMOL/L (ref 101–110)
CHOLEST SERPL-MCNC: 146 MG/DL
CO2 SERPL-SCNC: 31 MMOL/L (ref 21–32)
CREAT SERPL-MCNC: 0.7 MG/DL (ref 0.6–1)
CREATININE, URINE, POC: 200 MG/DL
DIFFERENTIAL METHOD BLD: NORMAL
EOSINOPHIL # BLD: 0.1 K/UL (ref 0–0.8)
EOSINOPHIL NFR BLD: 2 % (ref 0.5–7.8)
ERYTHROCYTE [DISTWIDTH] IN BLOOD BY AUTOMATED COUNT: 11.9 % (ref 11.9–14.6)
EST. AVERAGE GLUCOSE BLD GHB EST-MCNC: 126 MG/DL
GLOBULIN SER CALC-MCNC: 2.9 G/DL (ref 2.8–4.5)
GLUCOSE SERPL-MCNC: 121 MG/DL (ref 65–100)
GLUCOSE URINE, POC: NEGATIVE
HBA1C MFR BLD: 6 % (ref 4.8–5.6)
HCT VFR BLD AUTO: 40.5 % (ref 35.8–46.3)
HDLC SERPL-MCNC: 71 MG/DL (ref 40–60)
HDLC SERPL: 2.1
HGB BLD-MCNC: 13.4 G/DL (ref 11.7–15.4)
IMM GRANULOCYTES # BLD AUTO: 0 K/UL (ref 0–0.5)
IMM GRANULOCYTES NFR BLD AUTO: 0 % (ref 0–5)
KETONES, URINE, POC: NEGATIVE
LDLC SERPL CALC-MCNC: 58.8 MG/DL
LEUKOCYTE ESTERASE, URINE, POC: NEGATIVE
LYMPHOCYTES # BLD: 1.8 K/UL (ref 0.5–4.6)
LYMPHOCYTES NFR BLD: 31 % (ref 13–44)
MCH RBC QN AUTO: 30.8 PG (ref 26.1–32.9)
MCHC RBC AUTO-ENTMCNC: 33.1 G/DL (ref 31.4–35)
MCV RBC AUTO: 93.1 FL (ref 82–102)
MICROALB/CREAT RATIO, POC: <30 MG/G
MONOCYTES # BLD: 0.5 K/UL (ref 0.1–1.3)
MONOCYTES NFR BLD: 8 % (ref 4–12)
NEUTS SEG # BLD: 3.3 K/UL (ref 1.7–8.2)
NEUTS SEG NFR BLD: 58 % (ref 43–78)
NITRITE, URINE, POC: NEGATIVE
NRBC # BLD: 0 K/UL (ref 0–0.2)
PH, URINE, POC: 6 (ref 4.6–8)
PLATELET # BLD AUTO: 237 K/UL (ref 150–450)
PMV BLD AUTO: 10.5 FL (ref 9.4–12.3)
POTASSIUM SERPL-SCNC: 4.5 MMOL/L (ref 3.5–5.1)
PROT SERPL-MCNC: 6.8 G/DL (ref 6.3–8.2)
PROTEIN,URINE, POC: NEGATIVE
RBC # BLD AUTO: 4.35 M/UL (ref 4.05–5.2)
SODIUM SERPL-SCNC: 141 MMOL/L (ref 133–143)
SPECIFIC GRAVITY, URINE, POC: 1.02 (ref 1–1.03)
TRIGL SERPL-MCNC: 81 MG/DL (ref 35–150)
URINALYSIS CLARITY, POC: CLEAR
URINALYSIS COLOR, POC: YELLOW
UROBILINOGEN, POC: NORMAL
VLDLC SERPL CALC-MCNC: 16.2 MG/DL (ref 6–23)
WBC # BLD AUTO: 5.9 K/UL (ref 4.3–11.1)

## 2023-03-15 PROCEDURE — 81003 URINALYSIS AUTO W/O SCOPE: CPT | Performed by: INTERNAL MEDICINE

## 2023-03-15 PROCEDURE — 82044 UR ALBUMIN SEMIQUANTITATIVE: CPT | Performed by: INTERNAL MEDICINE

## 2023-03-29 ENCOUNTER — OFFICE VISIT (OUTPATIENT)
Dept: INTERNAL MEDICINE CLINIC | Facility: CLINIC | Age: 63
End: 2023-03-29
Payer: OTHER GOVERNMENT

## 2023-03-29 VITALS
HEART RATE: 73 BPM | OXYGEN SATURATION: 97 % | HEIGHT: 63 IN | DIASTOLIC BLOOD PRESSURE: 74 MMHG | BODY MASS INDEX: 22.68 KG/M2 | SYSTOLIC BLOOD PRESSURE: 118 MMHG | WEIGHT: 128 LBS

## 2023-03-29 DIAGNOSIS — E11.69 HYPERLIPIDEMIA ASSOCIATED WITH TYPE 2 DIABETES MELLITUS (HCC): ICD-10-CM

## 2023-03-29 DIAGNOSIS — Z23 NEED FOR PNEUMOCOCCAL VACCINE: ICD-10-CM

## 2023-03-29 DIAGNOSIS — E03.9 HYPOTHYROIDISM, UNSPECIFIED TYPE: ICD-10-CM

## 2023-03-29 DIAGNOSIS — E78.5 HYPERLIPIDEMIA ASSOCIATED WITH TYPE 2 DIABETES MELLITUS (HCC): ICD-10-CM

## 2023-03-29 DIAGNOSIS — M21.619 ASYMPTOMATIC BUNION: ICD-10-CM

## 2023-03-29 DIAGNOSIS — J45.20 MILD INTERMITTENT ASTHMA WITHOUT COMPLICATION: ICD-10-CM

## 2023-03-29 DIAGNOSIS — E11.9 TYPE 2 DIABETES MELLITUS WITHOUT COMPLICATION, WITHOUT LONG-TERM CURRENT USE OF INSULIN (HCC): Primary | ICD-10-CM

## 2023-03-29 DIAGNOSIS — F32.A DEPRESSIVE DISORDER: ICD-10-CM

## 2023-03-29 DIAGNOSIS — K76.0 HEPATIC STEATOSIS DETERMINED BY BIOPSY OF LIVER: ICD-10-CM

## 2023-03-29 PROCEDURE — 3044F HG A1C LEVEL LT 7.0%: CPT | Performed by: INTERNAL MEDICINE

## 2023-03-29 PROCEDURE — 99215 OFFICE O/P EST HI 40 MIN: CPT | Performed by: INTERNAL MEDICINE

## 2023-03-29 RX ORDER — ATORVASTATIN CALCIUM 10 MG/1
10 TABLET, FILM COATED ORAL NIGHTLY
Qty: 90 TABLET | Refills: 1 | Status: SHIPPED | OUTPATIENT
Start: 2023-03-29

## 2023-03-29 SDOH — ECONOMIC STABILITY: HOUSING INSECURITY
IN THE LAST 12 MONTHS, WAS THERE A TIME WHEN YOU DID NOT HAVE A STEADY PLACE TO SLEEP OR SLEPT IN A SHELTER (INCLUDING NOW)?: NO

## 2023-03-29 SDOH — ECONOMIC STABILITY: FOOD INSECURITY: WITHIN THE PAST 12 MONTHS, YOU WORRIED THAT YOUR FOOD WOULD RUN OUT BEFORE YOU GOT MONEY TO BUY MORE.: NEVER TRUE

## 2023-03-29 SDOH — ECONOMIC STABILITY: FOOD INSECURITY: WITHIN THE PAST 12 MONTHS, THE FOOD YOU BOUGHT JUST DIDN'T LAST AND YOU DIDN'T HAVE MONEY TO GET MORE.: NEVER TRUE

## 2023-03-29 SDOH — ECONOMIC STABILITY: INCOME INSECURITY: HOW HARD IS IT FOR YOU TO PAY FOR THE VERY BASICS LIKE FOOD, HOUSING, MEDICAL CARE, AND HEATING?: NOT VERY HARD

## 2023-03-29 ASSESSMENT — PATIENT HEALTH QUESTIONNAIRE - PHQ9
10. IF YOU CHECKED OFF ANY PROBLEMS, HOW DIFFICULT HAVE THESE PROBLEMS MADE IT FOR YOU TO DO YOUR WORK, TAKE CARE OF THINGS AT HOME, OR GET ALONG WITH OTHER PEOPLE: 1
9. THOUGHTS THAT YOU WOULD BE BETTER OFF DEAD, OR OF HURTING YOURSELF: 0
SUM OF ALL RESPONSES TO PHQ QUESTIONS 1-9: 3
8. MOVING OR SPEAKING SO SLOWLY THAT OTHER PEOPLE COULD HAVE NOTICED. OR THE OPPOSITE, BEING SO FIGETY OR RESTLESS THAT YOU HAVE BEEN MOVING AROUND A LOT MORE THAN USUAL: 0
SUM OF ALL RESPONSES TO PHQ9 QUESTIONS 1 & 2: 2
SUM OF ALL RESPONSES TO PHQ QUESTIONS 1-9: 3
1. LITTLE INTEREST OR PLEASURE IN DOING THINGS: 1
7. TROUBLE CONCENTRATING ON THINGS, SUCH AS READING THE NEWSPAPER OR WATCHING TELEVISION: 0
2. FEELING DOWN, DEPRESSED OR HOPELESS: 1
SUM OF ALL RESPONSES TO PHQ QUESTIONS 1-9: 3
6. FEELING BAD ABOUT YOURSELF - OR THAT YOU ARE A FAILURE OR HAVE LET YOURSELF OR YOUR FAMILY DOWN: 0
3. TROUBLE FALLING OR STAYING ASLEEP: 0
SUM OF ALL RESPONSES TO PHQ QUESTIONS 1-9: 3
5. POOR APPETITE OR OVEREATING: 0
4. FEELING TIRED OR HAVING LITTLE ENERGY: 1

## 2023-03-29 NOTE — PROGRESS NOTES
3/29/2023    Chief Complaint   Patient presents with    Follow-up     Chronic medical condition         Assessment and plan     1. Type 2 diabetes mellitus without complication, without long-term current use of insulin (HCC)  -     Amb External Referral To Podiatry  -     CBC with Auto Differential; Future  -     Comprehensive Metabolic Panel; Future  -     Hemoglobin A1C; Future  -     Lipid Panel; Future  2. Hypothyroidism, unspecified type  Comments:  Continue under the care of endocrinology. 3. Hyperlipidemia associated with type 2 diabetes mellitus (HCC)  -     atorvastatin (LIPITOR) 10 MG tablet; Take 1 tablet by mouth nightly, Disp-90 tablet, R-1Normal  -     Lipid Panel; Future  4. Mild intermittent asthma without complication  5. Need for pneumococcal vaccine  -     pneumococcal 20-valent conjugat (PREVNAR) 0.5 ML KENZIE inj; Inject 0.5 mLs into the muscle once for 1 dose, Disp-0.5 mL, R-0Normal  6. Depressive disorder  7. Hepatic steatosis determined by biopsy of liver  8. Asymptomatic bunion  -     Amb External Referral To Podiatry     Patient seems to be doing well. Test results discussed today  Prescription management: Continue current dose of all medications. Immunizations discussed today. Recommend :  Balanced diet low in carbohydrates and added sugars, rich in vegetables and fruit. Choose whole grains. Low salt/DASH diet recommended for HTN. Goal for treatment is less than 130/80mmHg. Recommend low fat low cholesterol diet . Reduce use  of saturated fat, trans fat and cholesterol. Increase soluble fiber like Metamucil;.     EXERCISE : At least 150 min /week of moderate intensity aerobic activity spread over more than 3 days, with not  more than 2 consecutive days without exercise         On 3/29/2023 I  have spent 40 minutes reviewing previous notes, test results and face to face with the patient discussing the diagnosis and importance of compliance with the treatment plan as well as

## 2023-06-05 DIAGNOSIS — E11.65 TYPE 2 DIABETES MELLITUS WITH HYPERGLYCEMIA, WITHOUT LONG-TERM CURRENT USE OF INSULIN (HCC): ICD-10-CM

## 2023-06-05 DIAGNOSIS — F32.A DEPRESSIVE DISORDER: ICD-10-CM

## 2023-06-06 RX ORDER — VILAZODONE HYDROCHLORIDE 40 MG/1
40 TABLET ORAL DAILY
Qty: 90 TABLET | Refills: 3 | Status: SHIPPED | OUTPATIENT
Start: 2023-06-06

## 2023-06-06 RX ORDER — BUPROPION HYDROCHLORIDE 300 MG/1
300 TABLET ORAL EVERY MORNING
Qty: 90 TABLET | Refills: 3 | Status: SHIPPED | OUTPATIENT
Start: 2023-06-06 | End: 2023-09-04

## 2023-07-31 DIAGNOSIS — E89.0 HYPOTHYROIDISM FOLLOWING RADIOIODINE THERAPY: ICD-10-CM

## 2023-07-31 LAB
T4 FREE SERPL-MCNC: 1.2 NG/DL (ref 0.78–1.46)
TSH, 3RD GENERATION: 0.9 UIU/ML (ref 0.36–3.74)

## 2023-08-03 ENCOUNTER — OFFICE VISIT (OUTPATIENT)
Dept: ENDOCRINOLOGY | Age: 63
End: 2023-08-03
Payer: OTHER GOVERNMENT

## 2023-08-03 VITALS
WEIGHT: 132.4 LBS | OXYGEN SATURATION: 98 % | DIASTOLIC BLOOD PRESSURE: 79 MMHG | SYSTOLIC BLOOD PRESSURE: 143 MMHG | BODY MASS INDEX: 23.45 KG/M2 | HEART RATE: 94 BPM

## 2023-08-03 DIAGNOSIS — Z86.39 HISTORY OF HYPERTHYROIDISM: ICD-10-CM

## 2023-08-03 DIAGNOSIS — E89.0 HYPOTHYROIDISM FOLLOWING RADIOIODINE THERAPY: Primary | ICD-10-CM

## 2023-08-03 DIAGNOSIS — Z86.39 HISTORY OF GRAVES' DISEASE: ICD-10-CM

## 2023-08-03 PROBLEM — N89.6 TIGHT INTROITUS: Status: RESOLVED | Noted: 2019-01-28 | Resolved: 2023-08-03

## 2023-08-03 PROBLEM — R91.1 PULMONARY NODULE: Status: RESOLVED | Noted: 2017-06-23 | Resolved: 2023-08-03

## 2023-08-03 PROCEDURE — 99213 OFFICE O/P EST LOW 20 MIN: CPT | Performed by: INTERNAL MEDICINE

## 2023-08-03 RX ORDER — LEVOTHYROXINE SODIUM 88 UG/1
88 TABLET ORAL
Qty: 90 TABLET | Refills: 3 | Status: SHIPPED | OUTPATIENT
Start: 2023-08-03

## 2023-08-03 ASSESSMENT — ENCOUNTER SYMPTOMS
DIARRHEA: 0
CONSTIPATION: 0

## 2023-08-03 NOTE — PROGRESS NOTES
Agustin Shen MD, [facility]            Reason for visit: History of hypothyroidism      ASSESSMENT AND PLAN:    1. Hypothyroidism following radioiodine therapy  Devendra Titus is biochemically euthyroid. Continue thyroid hormone replacement as prescribed. Residual symptoms are attributable to something other than thyroid dysfunction. Repeat thyroid function tests prior to the next appointment. - levothyroxine (SYNTHROID) 88 MCG tablet; Take 1 tablet by mouth every morning (before breakfast)  Dispense: 90 tablet; Refill: 3  - TSH; Future  - T4, Free; Future    2. History of hyperthyroidism    3. History of Graves' disease      Follow-up and Dispositions    Return in about 6 months (around 2/3/2024). History of Present Illness:    THYROID DYSFUNCTION  Devendra Titus is seen for follow-up of hypothyroidism. She was diagnosed with hyperthyroidism/Graves' disease in 1994 and was treated with radioactive iodine and rendered hypothyroid thereafter. Current symptoms:  See review of systems below     Prior treatment: She has been on levothyroxine or Synthroid since diagnosis.   The most recent therapy adjustments have been as follows:  -Synthroid 50 mcg daily to Synthroid 50 mcg daily 3 days/week and generic levothyroxine 75 mcg daily 4 days/week 3/15/2021  -Synthroid 88 mcg daily 4/28/2021     Pertinent labs:  11/6/2015: TSH 2.600.  3/10/2016: TSH 2.550.  8/11/2016: TSH 3.940.  4/24/2017: TSH 2.000.  9/26/2017: TSH 6.720.  11/21/2017: TSH 2.480.  3/15/2018: TSH 4.220.  9/18/2018: TSH 1.710.  1/21/2019: TSH 0.467.  4/18/2019: TSH 0.275.  6/27/2019: TSH 0.072, free T4 2.00.  8/28/2019: TSH 0.155.  10/22/2019: TSH 0.092.  5/27/2020: TSH 3.180.  7/21/2020: TSH 7.560.  9/10/2020: TSH 0.058.  11/20/2020: TSH 0.129, T4 10.3, free thyroxine index 3.3.  1/11/2021: TSH 0.150, T4 8.7, free thyroxine index 2.5, free T3 2.8.  3/8/2021: TSH

## 2023-09-06 ENCOUNTER — HOSPITAL ENCOUNTER (OUTPATIENT)
Dept: GENERAL RADIOLOGY | Age: 63
Discharge: HOME | End: 2023-09-08
Payer: OTHER GOVERNMENT

## 2023-09-06 ENCOUNTER — OFFICE VISIT (OUTPATIENT)
Dept: INTERNAL MEDICINE CLINIC | Facility: CLINIC | Age: 63
End: 2023-09-06
Payer: OTHER GOVERNMENT

## 2023-09-06 VITALS
SYSTOLIC BLOOD PRESSURE: 154 MMHG | WEIGHT: 130 LBS | HEART RATE: 107 BPM | DIASTOLIC BLOOD PRESSURE: 101 MMHG | HEIGHT: 63 IN | OXYGEN SATURATION: 98 % | BODY MASS INDEX: 23.04 KG/M2

## 2023-09-06 DIAGNOSIS — R03.0 ELEVATED BP WITHOUT DIAGNOSIS OF HYPERTENSION: ICD-10-CM

## 2023-09-06 DIAGNOSIS — M54.2 CERVICALGIA: ICD-10-CM

## 2023-09-06 DIAGNOSIS — M54.2 CERVICALGIA: Primary | ICD-10-CM

## 2023-09-06 DIAGNOSIS — F41.9 ANXIETY: ICD-10-CM

## 2023-09-06 PROCEDURE — 72040 X-RAY EXAM NECK SPINE 2-3 VW: CPT

## 2023-09-06 PROCEDURE — 99214 OFFICE O/P EST MOD 30 MIN: CPT | Performed by: FAMILY MEDICINE

## 2023-09-06 RX ORDER — IBUPROFEN 600 MG/1
600 TABLET ORAL 3 TIMES DAILY PRN
Qty: 30 TABLET | Refills: 0 | Status: SHIPPED | OUTPATIENT
Start: 2023-09-06

## 2023-09-06 RX ORDER — CYCLOBENZAPRINE HCL 5 MG
5 TABLET ORAL 2 TIMES DAILY PRN
Qty: 10 TABLET | Refills: 0 | Status: SHIPPED | OUTPATIENT
Start: 2023-09-06 | End: 2023-09-16

## 2023-09-06 ASSESSMENT — ENCOUNTER SYMPTOMS
SHORTNESS OF BREATH: 0
BLOOD IN STOOL: 0
ABDOMINAL PAIN: 0

## 2023-09-11 ENCOUNTER — TELEPHONE (OUTPATIENT)
Dept: INTERNAL MEDICINE CLINIC | Facility: CLINIC | Age: 63
End: 2023-09-11

## 2023-09-11 ENCOUNTER — HOSPITAL ENCOUNTER (OUTPATIENT)
Dept: PHYSICAL THERAPY | Age: 63
Setting detail: RECURRING SERIES
Discharge: HOME | End: 2023-09-14
Payer: OTHER GOVERNMENT

## 2023-09-11 DIAGNOSIS — M54.2 BILATERAL NECK PAIN: Primary | ICD-10-CM

## 2023-09-11 DIAGNOSIS — M43.6 ACUTE MUSCLE STIFFNESS OF NECK: ICD-10-CM

## 2023-09-11 DIAGNOSIS — M54.6 PAIN IN THORACIC SPINE: ICD-10-CM

## 2023-09-11 DIAGNOSIS — M62.81 MUSCLE WEAKNESS (GENERALIZED): ICD-10-CM

## 2023-09-11 PROCEDURE — 97140 MANUAL THERAPY 1/> REGIONS: CPT

## 2023-09-11 PROCEDURE — 97110 THERAPEUTIC EXERCISES: CPT

## 2023-09-11 PROCEDURE — 97161 PT EVAL LOW COMPLEX 20 MIN: CPT

## 2023-09-11 ASSESSMENT — PAIN SCALES - GENERAL: PAINLEVEL_OUTOF10: 2

## 2023-09-11 NOTE — PROGRESS NOTES
Ebony Choe  : 1960  Primary: Galina Beltrán (Other)  Secondary:  [address] @ [address] [facility] [address]  Phone: [phone]  Fax: [phone] Plan Frequency: 2 times per week for 8 weeks  Plan of Care/Certification Expiration Date: 11/10/23 (Plan of Care Start Date 2023)      >PT Visit Info:  Plan Frequency: 2 times per week for 8 weeks  Plan of Care/Certification Expiration Date: 11/10/23 (Plan of Care Start Date 2023)      Visit Count:  1    OUTPATIENT PHYSICAL THERAPY:OP NOTE TYPE: OP Note Type: Treatment Note 2023       Episode  }Appt Desk             Treatment Diagnosis:    Bilateral neck pain  Acute muscle stiffness of neck  Muscle weakness (generalized)  Pain in thoracic spine  Medical/Referring Diagnosis:  Cervicalgia [M54.2]  Referring Physician:  Stephen Mckeon MD MD Orders:  PT Eval and Treat   Date of Onset:  Onset Date: 23     Allergies:   Penicillins  Restrictions/Precautions:  Hx of Carpal Tunnel and Trigger Finger Release Bilaterally, Anxiety, Exercise Induced Asthma, Migraines     Interventions Planned (Treatment may consist of any combination of the following):    Current Treatment Recommendations: Strengthening; ROM; Balance training; Functional mobility training; Transfer training; ADL/Self-care training; IADL training; Endurance training; Gait training; Stair training; Neuromuscular re-education; Manual; Pain management; Return to work related activity; Home exercise program; Safety education & training; Patient/Caregiver education & training; Equipment evaluation, education, & procurement; Modalities; Positioning; Dry needling; Therapeutic activities     >Subjective Comments:   Patient reports no sharp pain, but does have achy pain.   >Initial:     2/10>Post Session:       0/10  Medications Last Reviewed:  2023  Updated Objective Findings:  See evaluation note from today  Treatment

## 2023-09-11 NOTE — THERAPY EVALUATION
Ava Camacho  : 1960  Primary: Jaya Last (Other)  Secondary:  [address] @ [address] [facility] [address]  Phone: [phone]  Fax: [phone] Plan Frequency: 2 times per week for 8 weeks    Plan of Care/Certification Expiration Date: 11/10/23 (Plan of Care Start Date 2023)      PT Visit Info:  Plan Frequency: 2 times per week for 8 weeks  Plan of Care/Certification Expiration Date: 11/10/23 (Plan of Care Start Date 2023)      Visit Count:  1                OUTPATIENT PHYSICAL THERAPY:             OP NOTE TYPE: Initial Assessment 2023               Episode (Neck Pain, Limited ROM) Appt Desk         Treatment Diagnosis:    Bilateral neck pain  Acute muscle stiffness of neck  Muscle weakness (generalized)  Pain in thoracic spine  Medical/Referring Diagnosis:  Cervicalgia [M54.2]  Referring Physician:  Fabienne Cruz MD MD Orders:  PT Eval and Treat   Return MD Appt:  10/10/2023  Date of Onset:  Onset Date: 23      Allergies:  Penicillins  Restrictions/Precautions:      Hx of Carpal Tunnel and Trigger Finger Release Bilaterally, Anxiety, Exercise Induced Asthma, Migraines     Medications Last Reviewed:  2023     SUBJECTIVE   History of Injury/Illness (Reason for Referral):  Ms. Sue Álvarez is a 59 y.o. female presenting to physical therapy with complaints of bilateral neck pain and tightness, right greater then left. Patient presents also having left shoulder pain. She reports that 3 weeks ago she was bending over to lift laundry, came back up and hit her head on shelf, causing pain and current symptoms. She denies sharp pain, blurred vision, or increased headaches. She reports medical history of migraines. 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 She reports feels the best when relaxed in recliner. She reports it is taking her longer to go to sleep.  She says it does not really wake her

## 2023-09-14 ENCOUNTER — HOSPITAL ENCOUNTER (OUTPATIENT)
Dept: PHYSICAL THERAPY | Age: 63
Setting detail: RECURRING SERIES
Discharge: HOME | End: 2023-09-17
Payer: OTHER GOVERNMENT

## 2023-09-14 PROCEDURE — 97140 MANUAL THERAPY 1/> REGIONS: CPT

## 2023-09-14 PROCEDURE — 97110 THERAPEUTIC EXERCISES: CPT

## 2023-09-14 ASSESSMENT — PAIN SCALES - GENERAL: PAINLEVEL_OUTOF10: 2

## 2023-09-14 NOTE — PROGRESS NOTES
0/10  Medications Last Reviewed:  9/14/2023  Updated Objective Findings:   Palpation:  Right Upper Trap, Levator, Scalenes, Paraspinals Moderate Tightness, Tenderness, Trigger Points. Treatment   THERAPEUTIC EXERCISE: (23 minutes):    Exercises per grid below to improve mobility, strength, balance, and coordination. Required moderate visual, verbal, manual, and tactile cues to promote proper body alignment, promote proper body posture, promote proper body mechanics, and promote proper body breathing techniques. Progressed resistance, range, repetitions, and complexity of movement as indicated. Date:  9/11/2023 Date:  9/14/2023 Date:     Activity/Exercise Parameters Parameters Parameters   Cervical AROM Seated Flexion/Extension 1 x 10, Head Supported    Seated Rotation 1 x 10,   Head Supported     Scapular Retraction 3 x 10 Seated 3 x 15 Standing with ER    Thoracic Extension 1 x 10 Seated      TRX  3 x 10 Flexion  3 x 10 Abduction    UBE  3 minutes FWD, 3 minutes BWD Working on Upright Posture                  Time spent with patient reviewing proper muscle recruitment and technique with exercises. MANUAL THERAPY: (30 minutes):   Joint mobilization and Soft tissue mobilization was utilized and necessary because of the patient's restricted joint motion, painful spasm, loss of articular motion, and restricted motion of soft tissue. Thoracic Mobilization Grade II/III Prone, CPA, UPA  Cervical Manual Traction  Sof Tissue Mobilization Suboccipitials, Scalenes, Upper Trap, Levator Scapula. MODALITIES: (0 minutes):        None Today      HEP: As above; handouts given to patient for all exercises. Treatment/Session Summary:    >Treatment Assessment:   Patient reported significant symptom relief with manual interventions coupled with graded ROM and strength. Patient required verbal cues for posture and form. Patient was instructed regarding overall upright posture.  She would benefit from continued

## 2023-09-19 ENCOUNTER — HOSPITAL ENCOUNTER (OUTPATIENT)
Dept: PHYSICAL THERAPY | Age: 63
Setting detail: RECURRING SERIES
Discharge: HOME | End: 2023-09-22
Payer: OTHER GOVERNMENT

## 2023-09-19 PROCEDURE — 97140 MANUAL THERAPY 1/> REGIONS: CPT

## 2023-09-19 PROCEDURE — 97110 THERAPEUTIC EXERCISES: CPT

## 2023-09-19 ASSESSMENT — PAIN SCALES - GENERAL: PAINLEVEL_OUTOF10: 2

## 2023-09-19 NOTE — PROGRESS NOTES
Patient continues to have limitations in right neck soft tissue mobility and postural awareness and strength. Patient would benefit from continued progression of mobility coupled with strength and stability. Communication/Consultation:   HEP  Equipment provided today:  None  Recommendations/Intent for next treatment session: Next visit will focus on mobility, flexibility, ROM, strength, and function.     >Total Treatment Billable Duration:  53 minutes  Time In: 1003  Time Out: 1104    Orval Fu, PT       Charge Capture  }Post Session Pain  PT Visit Info  [facility]  MD Guidelines  Scanned Media  Benefits  MyChart    Future Appointments   Date Time Provider [address]   9/21/2023 10:00 AM Orval Fu, PT [facility]   9/26/2023 10:00 AM Orval Fu, PT [facility]   9/28/2023 10:00 AM Orval Fu, PT [facility]   10/3/2023 10:00 AM Orval Fu, PT [facility]   10/4/2023  9:00 AM [facility]   10/5/2023 10:00 AM Orval Fu, PT [facility]   10/10/2023  8:20 AM Lisa Nayak MD [facility]   2/7/2024 10:45 AM Helen Dennis MD END [facility]

## 2023-09-21 ENCOUNTER — HOSPITAL ENCOUNTER (OUTPATIENT)
Dept: PHYSICAL THERAPY | Age: 63
Setting detail: RECURRING SERIES
Discharge: HOME | End: 2023-09-24
Payer: OTHER GOVERNMENT

## 2023-09-21 PROCEDURE — 97110 THERAPEUTIC EXERCISES: CPT

## 2023-09-21 PROCEDURE — 97140 MANUAL THERAPY 1/> REGIONS: CPT

## 2023-09-21 ASSESSMENT — PAIN SCALES - GENERAL: PAINLEVEL_OUTOF10: 2

## 2023-09-21 NOTE — PROGRESS NOTES
Jeny Alberto  : 1960  Primary: Priyanka Santos (Other)  Secondary:  [address] @ [address] [place] [address]  Phone: [phone]  Fax: [phone] Plan Frequency: 2 times per week for 8 weeks  Plan of Care/Certification Expiration Date: 11/10/23 (Plan of Care Start Date 2023)      >PT Visit Info:  Plan Frequency: 2 times per week for 8 weeks  Plan of Care/Certification Expiration Date: 11/10/23 (Plan of Care Start Date 2023)      Visit Count:  4    OUTPATIENT PHYSICAL THERAPY:OP NOTE TYPE: OP Note Type: Treatment Note 2023       Episode  }Appt Desk             Treatment Diagnosis:    Bilateral neck pain  Acute muscle stiffness of neck  Muscle weakness (generalized)  Pain in thoracic spine  Medical/Referring Diagnosis:  Cervicalgia [M54.2]  Referring Physician:  Alvaro Perez MD MD Orders:  PT Eval and Treat   Date of Onset:  Onset Date: 23     Allergies:   Penicillins  Restrictions/Precautions:  Hx of Carpal Tunnel and Trigger Finger Release Bilaterally, Anxiety, Exercise Induced Asthma, Migraines     Interventions Planned (Treatment may consist of any combination of the following):    Current Treatment Recommendations: Strengthening; ROM; Balance training; Functional mobility training; Transfer training; ADL/Self-care training; IADL training; Endurance training; Gait training; Stair training; Neuromuscular re-education; Manual; Pain management; Return to work related activity; Home exercise program; Safety education & training; Patient/Caregiver education & training; Equipment evaluation, education, & procurement; Modalities; Positioning; Dry needling;  Therapeutic activities     >Subjective Comments:   Patient reports that she is not having any real pain, just a little bit of tension and tightness from driving.  >Initial:     210>Post Session:       010  Medications Last Reviewed:  2023  Updated Objective

## 2023-09-26 ENCOUNTER — HOSPITAL ENCOUNTER (OUTPATIENT)
Dept: PHYSICAL THERAPY | Age: 63
Setting detail: RECURRING SERIES
Discharge: HOME | End: 2023-09-29
Payer: OTHER GOVERNMENT

## 2023-09-26 PROCEDURE — 97140 MANUAL THERAPY 1/> REGIONS: CPT

## 2023-09-26 PROCEDURE — 97110 THERAPEUTIC EXERCISES: CPT

## 2023-09-26 ASSESSMENT — PAIN SCALES - GENERAL: PAINLEVEL_OUTOF10: 1

## 2023-09-26 NOTE — PROGRESS NOTES
Findings:   Palpation:  Right Upper Trap, Levator, Scalenes, Paraspinals in Upper Trap and Levator and Moderate in Cervical/Thoracic Paraspinals Tightness, Tenderness, Trigger Points. Treatment   THERAPEUTIC EXERCISE: (30 minutes):    Exercises per grid below to improve mobility, strength, balance, and coordination. Required moderate visual, verbal, manual, and tactile cues to promote proper body alignment, promote proper body posture, promote proper body mechanics, and promote proper body breathing techniques. Progressed resistance, range, repetitions, and complexity of movement as indicated. Date:  9/21/2023 Date:  9/26/2023 Date:  9/19/2023   Activity/Exercise Parameters Parameters Parameters   Cervical AROM Seated Flexion/Extension 3 x 10, Head Supported    Seated Rotation 3 x 10,   Head Supported  Supine Chin Tuck 3 x 10, 1-2 sec Hold    Seated Flexion/Extension 3 x 10, Head Supported    Seated Rotation 3 x 10,   Head Supported   Scapular Retraction  3 x 15 Standing with ER 3 x 15 Standing with ER   Thoracic Extension      TRX 3 x 10 Flexion/Extension   3 x 10 Abduction 3 x 10 Flexion  3 x 10 Abduction 3 x 10 Flexion  3 x 10 Abduction   UBE  3 minutes FWD, 3 minutes BWD Working on Upright Posture    Shoulder Flexion Supine Lat Stretch 3 x 10, 5 sec Hold Bar Walk Out 3 x 10 Supine Lat Stretch 3 x 10, 5 sec Hold   Rows 3 x 10 Green 3 x 10 Green Standing    Shoulder Extension Bolster Against Wall 3 x 10 Foam 3 x 10 Red Standing      Time spent with patient reviewing proper muscle recruitment and technique with exercises. MANUAL THERAPY: (23 minutes):   Joint mobilization and Soft tissue mobilization was utilized and necessary because of the patient's restricted joint motion, painful spasm, loss of articular motion, and restricted motion of soft tissue.    Thoracic Mobilization Grade II/III Prone, CPA, UPA  Cervical Manual Traction  Sof Tissue Mobilization Suboccipitials, Scalenes, Upper Trap, Levator

## 2023-09-28 ENCOUNTER — HOSPITAL ENCOUNTER (OUTPATIENT)
Dept: PHYSICAL THERAPY | Age: 63
Setting detail: RECURRING SERIES
End: 2023-09-28
Payer: OTHER GOVERNMENT

## 2023-09-28 PROCEDURE — 97140 MANUAL THERAPY 1/> REGIONS: CPT

## 2023-09-28 PROCEDURE — 97110 THERAPEUTIC EXERCISES: CPT

## 2023-09-28 ASSESSMENT — PAIN SCALES - GENERAL: PAINLEVEL_OUTOF10: 1

## 2023-09-28 NOTE — PROGRESS NOTES
Gail Steiner  : 1960  Primary: Carlos Santana (Other)  Secondary:  [address] @ [address] [facility] [address]  Phone: [phone]  Fax: [phone] Plan Frequency: 2 times per week for 8 weeks  Plan of Care/Certification Expiration Date: 11/10/23 (Plan of Care Start Date 2023)      >PT Visit Info:  Plan Frequency: 2 times per week for 8 weeks  Plan of Care/Certification Expiration Date: 11/10/23 (Plan of Care Start Date 2023)      Visit Count:  6    OUTPATIENT PHYSICAL THERAPY:OP NOTE TYPE: OP Note Type: Treatment Note 2023       Episode  }Appt Desk             Treatment Diagnosis:    Bilateral neck pain  Acute muscle stiffness of neck  Muscle weakness (generalized)  Pain in thoracic spine  Medical/Referring Diagnosis:  Cervicalgia [M54.2]  Referring Physician:  Yan Lomas MD MD Orders:  PT Eval and Treat   Date of Onset:  Onset Date: 23     Allergies:   Penicillins  Restrictions/Precautions:  Hx of Carpal Tunnel and Trigger Finger Release Bilaterally, Anxiety, Exercise Induced Asthma, Migraines     Interventions Planned (Treatment may consist of any combination of the following):    Current Treatment Recommendations: Strengthening; ROM; Balance training; Functional mobility training; Transfer training; ADL/Self-care training; IADL training; Endurance training; Gait training; Stair training; Neuromuscular re-education; Manual; Pain management; Return to work related activity; Home exercise program; Safety education & training; Patient/Caregiver education & training; Equipment evaluation, education, & procurement; Modalities; Positioning; Dry needling;  Therapeutic activities     >Subjective Comments:   Patient reports that she is working on her new exercises, feeling alright.  >Initial:     10>Post Session:       010  Medications Last Reviewed:  2023  Updated Objective Findings:   Palpation:  Right Upper

## 2023-10-03 ENCOUNTER — HOSPITAL ENCOUNTER (OUTPATIENT)
Dept: PHYSICAL THERAPY | Age: 63
Setting detail: RECURRING SERIES
Discharge: HOME | End: 2023-10-06
Payer: OTHER GOVERNMENT

## 2023-10-03 PROCEDURE — 97140 MANUAL THERAPY 1/> REGIONS: CPT

## 2023-10-03 PROCEDURE — 97110 THERAPEUTIC EXERCISES: CPT

## 2023-10-03 ASSESSMENT — PATIENT HEALTH QUESTIONNAIRE - PHQ9
5. POOR APPETITE OR OVEREATING: 1
4. FEELING TIRED OR HAVING LITTLE ENERGY: 1
7. TROUBLE CONCENTRATING ON THINGS, SUCH AS READING THE NEWSPAPER OR WATCHING TELEVISION: 0
7. TROUBLE CONCENTRATING ON THINGS, SUCH AS READING THE NEWSPAPER OR WATCHING TELEVISION: NOT AT ALL
8. MOVING OR SPEAKING SO SLOWLY THAT OTHER PEOPLE COULD HAVE NOTICED. OR THE OPPOSITE, BEING SO FIGETY OR RESTLESS THAT YOU HAVE BEEN MOVING AROUND A LOT MORE THAN USUAL: 0
4. FEELING TIRED OR HAVING LITTLE ENERGY: SEVERAL DAYS
9. THOUGHTS THAT YOU WOULD BE BETTER OFF DEAD, OR OF HURTING YOURSELF: NOT AT ALL
SUM OF ALL RESPONSES TO PHQ9 QUESTIONS 1 & 2: 2
8. MOVING OR SPEAKING SO SLOWLY THAT OTHER PEOPLE COULD HAVE NOTICED. OR THE OPPOSITE - BEING SO FIDGETY OR RESTLESS THAT YOU HAVE BEEN MOVING AROUND A LOT MORE THAN USUAL: NOT AT ALL
2. FEELING DOWN, DEPRESSED OR HOPELESS: SEVERAL DAYS
3. TROUBLE FALLING OR STAYING ASLEEP: 1
6. FEELING BAD ABOUT YOURSELF - OR THAT YOU ARE A FAILURE OR HAVE LET YOURSELF OR YOUR FAMILY DOWN: SEVERAL DAYS
SUM OF ALL RESPONSES TO PHQ QUESTIONS 1-9: 6
6. FEELING BAD ABOUT YOURSELF - OR THAT YOU ARE A FAILURE OR HAVE LET YOURSELF OR YOUR FAMILY DOWN: 1
9. THOUGHTS THAT YOU WOULD BE BETTER OFF DEAD, OR OF HURTING YOURSELF: 0
SUM OF ALL RESPONSES TO PHQ QUESTIONS 1-9: 6
3. TROUBLE FALLING OR STAYING ASLEEP: SEVERAL DAYS
10. IF YOU CHECKED OFF ANY PROBLEMS, HOW DIFFICULT HAVE THESE PROBLEMS MADE IT FOR YOU TO DO YOUR WORK, TAKE CARE OF THINGS AT HOME, OR GET ALONG WITH OTHER PEOPLE: 0
5. POOR APPETITE OR OVEREATING: SEVERAL DAYS
SUM OF ALL RESPONSES TO PHQ QUESTIONS 1-9: 6
SUM OF ALL RESPONSES TO PHQ QUESTIONS 1-9: 6
10. IF YOU CHECKED OFF ANY PROBLEMS, HOW DIFFICULT HAVE THESE PROBLEMS MADE IT FOR YOU TO DO YOUR WORK, TAKE CARE OF THINGS AT HOME, OR GET ALONG WITH OTHER PEOPLE: NOT DIFFICULT AT ALL
1. LITTLE INTEREST OR PLEASURE IN DOING THINGS: SEVERAL DAYS
1. LITTLE INTEREST OR PLEASURE IN DOING THINGS: 1
SUM OF ALL RESPONSES TO PHQ QUESTIONS 1-9: 6
2. FEELING DOWN, DEPRESSED OR HOPELESS: 1

## 2023-10-03 ASSESSMENT — PAIN SCALES - GENERAL: PAINLEVEL_OUTOF10: 2

## 2023-10-03 NOTE — PROGRESS NOTES
Duyen Carrizales  : 1960  Primary: Roopa Jensen (Other)  Secondary:  [address] @ [address] [place] [address]  Phone: [phone]  Fax: [phone] Plan Frequency: 2 times per week for 8 weeks  Plan of Care/Certification Expiration Date: 11/10/23 (Plan of Care Start Date 2023)      >PT Visit Info:  Plan Frequency: 2 times per week for 8 weeks  Plan of Care/Certification Expiration Date: 11/10/23 (Plan of Care Start Date 2023)      Visit Count:  7    OUTPATIENT PHYSICAL THERAPY:OP NOTE TYPE: OP Note Type: Treatment Note 10/3/2023       Episode  }Appt Desk             Treatment Diagnosis:    Bilateral neck pain  Acute muscle stiffness of neck  Muscle weakness (generalized)  Pain in thoracic spine  Medical/Referring Diagnosis:  Cervicalgia [M54.2]  Referring Physician:  Nanci Melendez MD MD Orders:  PT Eval and Treat   Date of Onset:  Onset Date: 23     Allergies:   Penicillins  Restrictions/Precautions:  Hx of Carpal Tunnel and Trigger Finger Release Bilaterally, Anxiety, Exercise Induced Asthma, Migraines     Interventions Planned (Treatment may consist of any combination of the following):    Current Treatment Recommendations: Strengthening; ROM; Balance training; Functional mobility training; Transfer training; ADL/Self-care training; IADL training; Endurance training; Gait training; Stair training; Neuromuscular re-education; Manual; Pain management; Return to work related activity; Home exercise program; Safety education & training; Patient/Caregiver education & training; Equipment evaluation, education, & procurement; Modalities; Positioning; Dry needling; Therapeutic activities     >Subjective Comments:   Patient reports that she is having some right neck and shoulder pain and tightness after doing some lifting of water bottles.   >Initial:     2/10>Post Session:       010  Medications Last Reviewed:

## 2023-10-04 DIAGNOSIS — E11.9 TYPE 2 DIABETES MELLITUS WITHOUT COMPLICATION, WITHOUT LONG-TERM CURRENT USE OF INSULIN (HCC): ICD-10-CM

## 2023-10-04 DIAGNOSIS — E11.69 HYPERLIPIDEMIA ASSOCIATED WITH TYPE 2 DIABETES MELLITUS (HCC): ICD-10-CM

## 2023-10-04 DIAGNOSIS — E78.5 HYPERLIPIDEMIA ASSOCIATED WITH TYPE 2 DIABETES MELLITUS (HCC): ICD-10-CM

## 2023-10-04 LAB
ALBUMIN SERPL-MCNC: 3.9 G/DL (ref 3.2–4.6)
ALBUMIN/GLOB SERPL: 1.1 (ref 0.4–1.6)
ALP SERPL-CCNC: 61 U/L (ref 50–136)
ALT SERPL-CCNC: 28 U/L (ref 12–65)
ANION GAP SERPL CALC-SCNC: 5 MMOL/L (ref 2–11)
AST SERPL-CCNC: 18 U/L (ref 15–37)
BASOPHILS # BLD: 0.1 K/UL (ref 0–0.2)
BASOPHILS NFR BLD: 1 % (ref 0–2)
BILIRUB SERPL-MCNC: 0.6 MG/DL (ref 0.2–1.1)
BUN SERPL-MCNC: 16 MG/DL (ref 8–23)
CALCIUM SERPL-MCNC: 9.5 MG/DL (ref 8.3–10.4)
CHLORIDE SERPL-SCNC: 110 MMOL/L (ref 101–110)
CHOLEST SERPL-MCNC: 154 MG/DL
CO2 SERPL-SCNC: 30 MMOL/L (ref 21–32)
CREAT SERPL-MCNC: 0.9 MG/DL (ref 0.6–1)
DIFFERENTIAL METHOD BLD: NORMAL
EOSINOPHIL # BLD: 0.2 K/UL (ref 0–0.8)
EOSINOPHIL NFR BLD: 3 % (ref 0.5–7.8)
ERYTHROCYTE [DISTWIDTH] IN BLOOD BY AUTOMATED COUNT: 11.9 % (ref 11.9–14.6)
EST. AVERAGE GLUCOSE BLD GHB EST-MCNC: 126 MG/DL
GLOBULIN SER CALC-MCNC: 3.5 G/DL (ref 2.8–4.5)
GLUCOSE SERPL-MCNC: 121 MG/DL (ref 65–100)
HBA1C MFR BLD: 6 % (ref 4.8–5.6)
HCT VFR BLD AUTO: 40.6 % (ref 35.8–46.3)
HDLC SERPL-MCNC: 71 MG/DL (ref 40–60)
HDLC SERPL: 2.2
HGB BLD-MCNC: 13.5 G/DL (ref 11.7–15.4)
IMM GRANULOCYTES # BLD AUTO: 0 K/UL (ref 0–0.5)
IMM GRANULOCYTES NFR BLD AUTO: 0 % (ref 0–5)
LDLC SERPL CALC-MCNC: 67 MG/DL
LYMPHOCYTES # BLD: 2 K/UL (ref 0.5–4.6)
LYMPHOCYTES NFR BLD: 34 % (ref 13–44)
MCH RBC QN AUTO: 31 PG (ref 26.1–32.9)
MCHC RBC AUTO-ENTMCNC: 33.3 G/DL (ref 31.4–35)
MCV RBC AUTO: 93.1 FL (ref 82–102)
MONOCYTES # BLD: 0.4 K/UL (ref 0.1–1.3)
MONOCYTES NFR BLD: 7 % (ref 4–12)
NEUTS SEG # BLD: 3.2 K/UL (ref 1.7–8.2)
NEUTS SEG NFR BLD: 55 % (ref 43–78)
NRBC # BLD: 0 K/UL (ref 0–0.2)
PLATELET # BLD AUTO: 258 K/UL (ref 150–450)
PMV BLD AUTO: 10.3 FL (ref 9.4–12.3)
POTASSIUM SERPL-SCNC: 4.8 MMOL/L (ref 3.5–5.1)
PROT SERPL-MCNC: 7.4 G/DL (ref 6.3–8.2)
RBC # BLD AUTO: 4.36 M/UL (ref 4.05–5.2)
SODIUM SERPL-SCNC: 145 MMOL/L (ref 133–143)
TRIGL SERPL-MCNC: 80 MG/DL (ref 35–150)
VLDLC SERPL CALC-MCNC: 16 MG/DL (ref 6–23)
WBC # BLD AUTO: 5.8 K/UL (ref 4.3–11.1)

## 2023-10-05 ENCOUNTER — HOSPITAL ENCOUNTER (OUTPATIENT)
Dept: PHYSICAL THERAPY | Age: 63
Setting detail: RECURRING SERIES
Discharge: HOME | End: 2023-10-08
Payer: OTHER GOVERNMENT

## 2023-10-05 PROCEDURE — 97140 MANUAL THERAPY 1/> REGIONS: CPT

## 2023-10-05 PROCEDURE — 97110 THERAPEUTIC EXERCISES: CPT

## 2023-10-05 ASSESSMENT — PAIN SCALES - GENERAL: PAINLEVEL_OUTOF10: 1

## 2023-10-05 NOTE — PROGRESS NOTES
Jonnie Severance  : 1960  Primary: Juliane Joshi (Other)  Secondary:  [address] @ [address] [facility] [address]  Phone: [phone]  Fax: [phone] Plan Frequency: 2 times per week for 8 weeks  Plan of Care/Certification Expiration Date: 11/10/23 (Plan of Care Start Date 2023)      >PT Visit Info:  Plan Frequency: 2 times per week for 8 weeks  Plan of Care/Certification Expiration Date: 11/10/23 (Plan of Care Start Date 2023)      Visit Count:  8    OUTPATIENT PHYSICAL THERAPY:OP NOTE TYPE: OP Note Type: Treatment Note 10/5/2023       Episode  }Appt Desk             Treatment Diagnosis:    Bilateral neck pain  Acute muscle stiffness of neck  Muscle weakness (generalized)  Pain in thoracic spine  Medical/Referring Diagnosis:  Cervicalgia [M54.2]  Referring Physician:  Aria John MD MD Orders:  PT Eval and Treat   Date of Onset:  Onset Date: 23     Allergies:   Penicillins  Restrictions/Precautions:  Hx of Carpal Tunnel and Trigger Finger Release Bilaterally, Anxiety, Exercise Induced Asthma, Migraines     Interventions Planned (Treatment may consist of any combination of the following):    Current Treatment Recommendations: Strengthening; ROM; Balance training; Functional mobility training; Transfer training; ADL/Self-care training; IADL training; Endurance training; Gait training; Stair training; Neuromuscular re-education; Manual; Pain management; Return to work related activity; Home exercise program; Safety education & training; Patient/Caregiver education & training; Equipment evaluation, education, & procurement; Modalities; Positioning; Dry needling; Therapeutic activities     >Subjective Comments:   Patient reports that she is working on putting together a garage sale and has been doing some lifting, neck and shoulders are sore.   >Initial:     1/10>Post Session:       0/10  Medications Last Reviewed:

## 2023-10-05 NOTE — PROGRESS NOTES
Steven Malina  : 1960  Primary: Madi Clark (Other)  Secondary:  [address] @ [address] [facility] [address]  Phone: [phone]  Fax: [phone] Plan Frequency: 2 times per week for 8 weeks    Plan of Care/Certification Expiration Date: 11/10/23 (Plan of Care Start Date 2023)      PT Visit Info:  Plan Frequency: 2 times per week for 8 weeks  Plan of Care/Certification Expiration Date: 11/10/23 (Plan of Care Start Date 2023)      Visit Count:  8                OUTPATIENT PHYSICAL THERAPY:             Progress Report 10/5/2023               Episode (Neck Pain, Limited ROM) Appt Desk         Treatment Diagnosis:    No data found  Medical/Referring Diagnosis:  Cervicalgia [M54.2]  Referring Physician:  Alvin Otto MD MD Orders:  PT Eval and Treat   Return MD Appt:  10/10/2023  Date of Onset:  Onset Date: 23      Allergies:  Penicillins  Restrictions/Precautions:      Hx of Carpal Tunnel and Trigger Finger Release Bilaterally, Anxiety, Exercise Induced Asthma, Migraines     Medications Last Reviewed:  10/5/2023      OBJECTIVE     Patient denies any headaches, changes in vision, dizziness, vertigo, nausea, drop attacks, black outs, tinnitus, dysphagia, dysarthria, LE symptoms or bowel/bladder dysfunction. Observation/Orthostatic Postural Assessment:          Patient presents with forward head, and rounded shoulders. Palpation:          Patient presented with moderate bilateral cervical, thoracic paraspinal, suboccipitals, Upper Trapezius, Levator Scapula, SCM, Scalenes, Right greater then Left. Vertebral-Basilar Screen: Hautant's test is negative. Positional Provocation testing is negative.     ROM:            AROM/ PROM Degrees   Cervical Flexion 63 (from 50)   Cervical Extension 54 (from 34)   Right Rotation 65   Left Rotation 72 (from 60)   Right Lateral Flexion 50 (from 34)   Left Lateral Flexion 40

## 2023-10-09 ENCOUNTER — HOSPITAL ENCOUNTER (OUTPATIENT)
Dept: PHYSICAL THERAPY | Age: 63
Setting detail: RECURRING SERIES
Discharge: HOME | End: 2023-10-12
Payer: OTHER GOVERNMENT

## 2023-10-09 PROCEDURE — 97110 THERAPEUTIC EXERCISES: CPT

## 2023-10-09 PROCEDURE — 97140 MANUAL THERAPY 1/> REGIONS: CPT

## 2023-10-09 ASSESSMENT — PAIN SCALES - GENERAL: PAINLEVEL_OUTOF10: 1

## 2023-10-09 NOTE — PROGRESS NOTES
Cesar Acevedo  : 1960  Primary: Esther Patricio (Other)  Secondary:  [address] @ [address] [address]  Phone: [phone]  Fax: [phone] Plan Frequency: 2 times per week for 8 weeks  Plan of Care/Certification Expiration Date: 11/10/23 (Plan of Care Start Date 2023)      >PT Visit Info:  Plan Frequency: 2 times per week for 8 weeks  Plan of Care/Certification Expiration Date: 11/10/23 (Plan of Care Start Date 2023)      Visit Count:  9    OUTPATIENT PHYSICAL THERAPY:OP NOTE TYPE: OP Note Type: Treatment Note 10/9/2023       Episode  }Appt Desk             Treatment Diagnosis:    Bilateral neck pain  Acute muscle stiffness of neck  Muscle weakness (generalized)  Pain in thoracic spine  Medical/Referring Diagnosis:  Cervicalgia [M54.2]  Referring Physician:  Alcira Sherwood MD MD Orders:  PT Eval and Treat   Date of Onset:  Onset Date: 23     Allergies:   Penicillins  Restrictions/Precautions:  Hx of Carpal Tunnel and Trigger Finger Release Bilaterally, Anxiety, Exercise Induced Asthma, Migraines     Interventions Planned (Treatment may consist of any combination of the following):    Current Treatment Recommendations: Strengthening; ROM; Balance training; Functional mobility training; Transfer training; ADL/Self-care training; IADL training; Endurance training; Gait training; Stair training; Neuromuscular re-education; Manual; Pain management; Return to work related activity; Home exercise program; Safety education & training; Patient/Caregiver education & training; Equipment evaluation, education, & procurement; Modalities; Positioning; Dry needling; Therapeutic activities     >Subjective Comments:   Patient reports that overall she continues to feel better.   >Initial:     1/10>Post Session:       010  Medications Last Reviewed:  10/9/2023  Updated Objective Findings:   Palpation:  Bilateral Upper Trap,

## 2023-10-10 ENCOUNTER — OFFICE VISIT (OUTPATIENT)
Dept: INTERNAL MEDICINE CLINIC | Facility: CLINIC | Age: 63
End: 2023-10-10
Payer: OTHER GOVERNMENT

## 2023-10-10 VITALS
HEART RATE: 72 BPM | RESPIRATION RATE: 16 BRPM | WEIGHT: 132 LBS | HEIGHT: 63 IN | OXYGEN SATURATION: 99 % | SYSTOLIC BLOOD PRESSURE: 128 MMHG | BODY MASS INDEX: 23.39 KG/M2 | TEMPERATURE: 97.4 F | DIASTOLIC BLOOD PRESSURE: 86 MMHG

## 2023-10-10 DIAGNOSIS — E11.65 TYPE 2 DIABETES MELLITUS WITH HYPERGLYCEMIA, WITHOUT LONG-TERM CURRENT USE OF INSULIN (HCC): ICD-10-CM

## 2023-10-10 DIAGNOSIS — E78.5 HYPERLIPIDEMIA ASSOCIATED WITH TYPE 2 DIABETES MELLITUS (HCC): ICD-10-CM

## 2023-10-10 DIAGNOSIS — Z12.31 ENCOUNTER FOR SCREENING MAMMOGRAM FOR BREAST CANCER: ICD-10-CM

## 2023-10-10 DIAGNOSIS — Z12.12 ENCOUNTER FOR SCREENING FOR COLORECTAL MALIGNANT NEOPLASM: ICD-10-CM

## 2023-10-10 DIAGNOSIS — Z00.00 ENCOUNTER FOR WELL ADULT EXAM WITHOUT ABNORMAL FINDINGS: Primary | ICD-10-CM

## 2023-10-10 DIAGNOSIS — K76.0 HEPATIC STEATOSIS DETERMINED BY BIOPSY OF LIVER: ICD-10-CM

## 2023-10-10 DIAGNOSIS — Z12.11 ENCOUNTER FOR SCREENING FOR COLORECTAL MALIGNANT NEOPLASM: ICD-10-CM

## 2023-10-10 DIAGNOSIS — E03.9 HYPOTHYROIDISM, UNSPECIFIED TYPE: ICD-10-CM

## 2023-10-10 DIAGNOSIS — E11.69 HYPERLIPIDEMIA ASSOCIATED WITH TYPE 2 DIABETES MELLITUS (HCC): ICD-10-CM

## 2023-10-10 PROCEDURE — 99396 PREV VISIT EST AGE 40-64: CPT | Performed by: INTERNAL MEDICINE

## 2023-10-10 RX ORDER — CHLORHEXIDINE GLUCONATE ORAL RINSE 1.2 MG/ML
15 SOLUTION DENTAL 2 TIMES DAILY
Qty: 420 ML | Refills: 0 | Status: SHIPPED | OUTPATIENT
Start: 2023-10-10 | End: 2023-10-24

## 2023-10-10 NOTE — PROGRESS NOTES
vaccine (1 of 3 - Risk 3-dose series) Never done    Flu vaccine (1) 08/01/2023    Breast cancer screen  11/28/2023    Diabetic Alb to Cr ratio (uACR) test  03/15/2024    Diabetic foot exam  03/29/2024    Depression Monitoring  10/03/2024    A1C test (Diabetic or Prediabetic)  10/04/2024    Lipids  10/04/2024    GFR test (Diabetes, CKD 3-4, OR last GFR 15-59)  10/04/2024    Colorectal Cancer Screen  10/27/2026    Shingles vaccine  Completed    Hepatitis A vaccine  Aged Out    Hib vaccine  Aged Out    Meningococcal (ACWY) vaccine  Aged Out     Recommendations for [place] Due: see orders and patient instructions/AVS.    She generally seems to be doing well. Discussed test results today. Liver enzymes remain normal.  Diabetes is controlled. Chronic medical conditions seem to be well controlled. She is to continue current medications. For decreased libido-dryness I recommended that she sees gynecologist.  Javed Francis with lubricants. She is counseled on vaccinations. Counseled on COVID-19. Return in about 6 months (around 4/10/2024) for Chronic visit follow up, Fasting labs one week prior.

## 2023-10-17 ENCOUNTER — HOSPITAL ENCOUNTER (OUTPATIENT)
Dept: PHYSICAL THERAPY | Age: 63
Setting detail: RECURRING SERIES
Discharge: HOME | End: 2023-10-20
Payer: OTHER GOVERNMENT

## 2023-10-17 PROCEDURE — 97110 THERAPEUTIC EXERCISES: CPT

## 2023-10-17 PROCEDURE — 97140 MANUAL THERAPY 1/> REGIONS: CPT

## 2023-10-17 ASSESSMENT — PAIN SCALES - GENERAL: PAINLEVEL_OUTOF10: 2

## 2023-10-17 NOTE — PROGRESS NOTES
Wolf Schumacher  : 1960  Primary: [place] Narayanan (Other)  Secondary:  [address] @ [address] [facility] [address]  Phone: [phone]  Fax: [phone] Plan Frequency: 2 times per week for 8 weeks  Plan of Care/Certification Expiration Date: 11/10/23 (Plan of Care Start Date 2023)      >PT Visit Info:  Plan Frequency: 2 times per week for 8 weeks  Plan of Care/Certification Expiration Date: 11/10/23 (Plan of Care Start Date 2023)      Visit Count:  10    OUTPATIENT PHYSICAL THERAPY:OP NOTE TYPE: OP Note Type: Treatment Note 10/17/2023       Episode  }Appt Desk             Treatment Diagnosis:    Bilateral neck pain  Acute muscle stiffness of neck  Muscle weakness (generalized)  Pain in thoracic spine  Medical/Referring Diagnosis:  Cervicalgia [M54.2]  Referring Physician:  Alberto Tinoco MD MD Orders:  PT Eval and Treat   Date of Onset:  Onset Date: 23     Allergies:   Penicillins  Restrictions/Precautions:  Hx of Carpal Tunnel and Trigger Finger Release Bilaterally, Anxiety, Exercise Induced Asthma, Migraines     Interventions Planned (Treatment may consist of any combination of the following):    Current Treatment Recommendations: Strengthening; ROM; Balance training; Functional mobility training; Transfer training; ADL/Self-care training; IADL training; Endurance training; Gait training; Stair training; Neuromuscular re-education; Manual; Pain management; Return to work related activity; Home exercise program; Safety education & training; Patient/Caregiver education & training; Equipment evaluation, education, & procurement; Modalities; Positioning; Dry needling; Therapeutic activities     >Subjective Comments:   Patient reports that she did fine going on her trip, but then between carrying her grandson around and driving home, her neck and left shoulder bothered her and is sore today, mainly top of left shoulder.

## 2023-10-24 ENCOUNTER — HOSPITAL ENCOUNTER (OUTPATIENT)
Dept: PHYSICAL THERAPY | Age: 63
Setting detail: RECURRING SERIES
Discharge: HOME | End: 2023-10-27
Payer: OTHER GOVERNMENT

## 2023-10-24 PROCEDURE — 97140 MANUAL THERAPY 1/> REGIONS: CPT

## 2023-10-24 PROCEDURE — 97110 THERAPEUTIC EXERCISES: CPT

## 2023-10-24 ASSESSMENT — PAIN SCALES - GENERAL: PAINLEVEL_OUTOF10: 2

## 2023-10-24 NOTE — PROGRESS NOTES
Palpation:  Left greater then Right, Bilateral Upper Trap, Levator, Scalenes, Paraspinals in Upper Trap and Levator and Trace to Mild in Cervical/Thoracic Paraspinals Tightness, Tenderness, Trigger Points. Mobility: Cervical/Thoracic Hypomobility. Treatment   THERAPEUTIC EXERCISE: (23 minutes):    Exercises per grid below to improve mobility, strength, balance, and coordination. Required moderate visual, verbal, manual, and tactile cues to promote proper body alignment, promote proper body posture, promote proper body mechanics, and promote proper body breathing techniques. Progressed resistance, range, repetitions, and complexity of movement as indicated. Date:  10/17/2023 Date:  10/24/2023 Date:  10/9/2023   Activity/Exercise Parameters Parameters Parameters   Cervical AROM          Scapular Retraction  3 x 20 Standing Red 3 x 100 feet Walking with ER Red   Trunk Rotation  Supine x30    Sidelying x 30 Each Side Open Book Supine x30    Sidelying x 30 Each Side Open Book   TRX 3 x 10 Flexion    3 x 10 Abduction    3 x 10 Extension 3 x 10 Flexion    3 x 10 Abduction    3 x 10 Extension 3 x 10 Flexion    3 x 10 Abduction    3 x 10 Extension   UBE 3 minutes FWD, 3 minutes BWD Working on Upright Posture 3 minutes FWD, 3 minutes BWD Working on Upright Posture 3 minutes FWD, 3 minutes BWD Working on Upright Posture   Shoulder Flexion  Bar Walk Out 3 x 10    Rows 3 x 10 Green 3 x 10 Green Standing 3 x 10 Green Standing   Shoulder Extension 3 x 10 Red Standing 3 x 10 Red Standing 3 x 10 Red Standing     Time spent with patient reviewing proper muscle recruitment and technique with exercises. MANUAL THERAPY: (30 minutes):   Joint mobilization and Soft tissue mobilization was utilized and necessary because of the patient's restricted joint motion, painful spasm, loss of articular motion, and restricted motion of soft tissue.    Thoracic Mobilization Grade II/III Prone, CPA, UPA  Cervical Manual Traction  Sof Tissue

## 2023-11-07 ENCOUNTER — HOSPITAL ENCOUNTER (OUTPATIENT)
Dept: PHYSICAL THERAPY | Age: 63
Setting detail: RECURRING SERIES
Discharge: HOME | End: 2023-11-10
Payer: OTHER GOVERNMENT

## 2023-11-07 PROCEDURE — 97110 THERAPEUTIC EXERCISES: CPT

## 2023-11-07 ASSESSMENT — PAIN SCALES - GENERAL: PAINLEVEL_OUTOF10: 2

## 2023-11-07 NOTE — THERAPY DISCHARGE
Ang Mcfarlane  : 1960  Primary: [place] (Other)  Secondary:  [address] @ [address] [facility] [address]  Phone: [phone]  Fax: [phone] Plan Frequency: 2 times per week for 8 weeks    Plan of Care/Certification Expiration Date: 11/10/23 (Plan of Care Start Date 2023)      PT Visit Info:  Plan Frequency: 2 times per week for 8 weeks  Plan of Care/Certification Expiration Date: 11/10/23 (Plan of Care Start Date 2023)      Visit Count:  12                OUTPATIENT PHYSICAL THERAPY:             Discharge Summary 2023               Episode (Neck Pain, Limited ROM) Appt Desk         Treatment Diagnosis:    No data found  Medical/Referring Diagnosis:  Cervicalgia [M54.2]  Referring Physician:  Kayla Palacios MD MD Orders:  PT Eval and Treat   Return MD Appt:  10/10/2023  Date of Onset:  Onset Date: 23      Allergies:  Penicillins  Restrictions/Precautions:      Hx of Carpal Tunnel and Trigger Finger Release Bilaterally, Anxiety, Exercise Induced Asthma, Migraines     Medications Last Reviewed:  2023      OBJECTIVE     Patient denies any headaches, changes in vision, dizziness, vertigo, nausea, drop attacks, black outs, tinnitus, dysphagia, dysarthria, LE symptoms or bowel/bladder dysfunction. Observation/Orthostatic Postural Assessment:          Patient presents with forward head, and rounded shoulders. Palpation:          Patient presented with mild (from moderate) left greater then right (from bilateral) cervical, thoracic paraspinal, suboccipitals, Upper Trapezius, Levator Scapula, SCM, Scalenes, Right greater then Left. Vertebral-Basilar Screen: Hautant's test is negative. Positional Provocation testing is negative.     ROM:            AROM/ PROM Degrees   Cervical Flexion 63 (from 50)   Cervical Extension 54 (from 34)   Right Rotation 72 (from 65)   Left Rotation 80 (from 60)   Right
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
## 2023-11-16 ENCOUNTER — OFFICE VISIT (OUTPATIENT)
Dept: OBGYN CLINIC | Age: 63
End: 2023-11-16
Payer: OTHER GOVERNMENT

## 2023-11-16 VITALS — SYSTOLIC BLOOD PRESSURE: 118 MMHG | WEIGHT: 134 LBS | BODY MASS INDEX: 23.74 KG/M2 | DIASTOLIC BLOOD PRESSURE: 68 MMHG

## 2023-11-16 DIAGNOSIS — F34.1 PERSISTENT DEPRESSIVE DISORDER: ICD-10-CM

## 2023-11-16 DIAGNOSIS — N95.1 MENOPAUSE SYNDROME: Primary | ICD-10-CM

## 2023-11-16 DIAGNOSIS — N95.2 VAGINAL ATROPHY: ICD-10-CM

## 2023-11-16 PROCEDURE — 99204 OFFICE O/P NEW MOD 45 MIN: CPT | Performed by: OBSTETRICS & GYNECOLOGY

## 2023-11-16 RX ORDER — ESTRADIOL 1 MG/1
1 TABLET ORAL DAILY
Qty: 90 TABLET | Refills: 4 | Status: SHIPPED | OUTPATIENT
Start: 2023-11-16

## 2023-11-16 RX ORDER — ESTRADIOL 0.1 MG/G
CREAM VAGINAL
Qty: 42.5 G | Refills: 3 | Status: SHIPPED | OUTPATIENT
Start: 2023-11-16

## 2023-11-16 NOTE — PROGRESS NOTES
HPI  Author Anna is a 59 y.o. female seen to discuss hormones. Her main concern is depression and crying. Her PCP has her on antidepressants but she doesn't think they are working. She doesn't have any hot flashes but occasionally will have night sweats but she is under a lot of covers because her  keeps the house so cold. She wants to see if hormones will help with the way she is feeling even though she never had any menopause symptoms years ago when she first entered menopause    Past Medical History, Past Surgical History, Family history, Social History, and Medications were all reviewed with the patient today and updated as necessary. Current Outpatient Medications   Medication Sig    estradiol (ESTRACE VAGINAL) 0.1 MG/GM vaginal cream 1 gram vaginally 2x a week    estradiol (ESTRACE) 1 MG tablet Take 1 tablet by mouth daily    ibuprofen (ADVIL;MOTRIN) 600 MG tablet Take 1 tablet by mouth 3 times daily as needed for Pain    levothyroxine (SYNTHROID) 88 MCG tablet Take 1 tablet by mouth every morning (before breakfast)    buPROPion (WELLBUTRIN XL) 300 MG extended release tablet Take 1 tablet by mouth every morning    metFORMIN (GLUCOPHAGE) 500 MG tablet Take one tablet a day with evening meal    vilazodone HCl (VIIBRYD) 40 MG TABS Take 1 tablet by mouth daily    atorvastatin (LIPITOR) 10 MG tablet Take 1 tablet by mouth nightly    blood glucose test strips (FREESTYLE LITE) strip 1 each by In Vitro route daily Check BS once per day Indication: E11.9    SUMAtriptan-Naproxen Sodium (TREXIMET)  MG TABS tablet Take 1 Tab by mouth once as needed.  May repeat 2 hours after first dose if symptoms persisit Indications: a migraine headache    albuterol sulfate  (90 Base) MCG/ACT inhaler USE 2 INHALATIONS EVERY 6 HOURS AS NEEDED FOR WHEEZING    fluticasone (FLONASE) 50 MCG/ACT nasal spray 2 sprays by Nasal route daily as needed     No current facility-administered medications for this

## 2023-11-17 ENCOUNTER — PATIENT MESSAGE (OUTPATIENT)
Dept: OBGYN CLINIC | Age: 63
End: 2023-11-17

## 2023-11-17 DIAGNOSIS — N95.2 VAGINAL ATROPHY: Primary | ICD-10-CM

## 2023-11-17 DIAGNOSIS — E11.69 HYPERLIPIDEMIA ASSOCIATED WITH TYPE 2 DIABETES MELLITUS (HCC): ICD-10-CM

## 2023-11-17 DIAGNOSIS — E78.5 HYPERLIPIDEMIA ASSOCIATED WITH TYPE 2 DIABETES MELLITUS (HCC): ICD-10-CM

## 2023-11-20 RX ORDER — ATORVASTATIN CALCIUM 10 MG/1
10 TABLET, FILM COATED ORAL NIGHTLY
Qty: 90 TABLET | Refills: 2 | Status: SHIPPED | OUTPATIENT
Start: 2023-11-20

## 2023-11-28 RX ORDER — ESTRADIOL 0.1 MG/G
CREAM VAGINAL
Qty: 42.5 G | Refills: 5 | Status: SHIPPED | OUTPATIENT
Start: 2023-11-28

## 2023-11-28 NOTE — TELEPHONE ENCOUNTER
From: Jeny Alberto  To: Dr. Ang Thomas: 11/17/2023 6:00 PM EST  Subject: Prescription Estradiol cream    Good evening Dr. Sarthak De Los Santos:    I had a voicemail message from [employer] to find out if there is a substitute, or cancel prescription on the estradiol I opted to cancel. Could you please have this filled at [facility] in [facility]? Also, I appreciate you taking the time to listen to me. I did some research on [employer]. No blood work, but they do a cotton swab for genetic testing. Going to make an appoint and see how it goes. Thank you!   Danitza Alfredo

## 2023-11-30 DIAGNOSIS — Z12.31 ENCOUNTER FOR SCREENING MAMMOGRAM FOR BREAST CANCER: ICD-10-CM

## 2023-12-05 ENCOUNTER — PATIENT MESSAGE (OUTPATIENT)
Dept: INTERNAL MEDICINE CLINIC | Facility: CLINIC | Age: 63
End: 2023-12-05

## 2023-12-05 DIAGNOSIS — F32.A DEPRESSIVE DISORDER: ICD-10-CM

## 2023-12-05 NOTE — TELEPHONE ENCOUNTER
From: Yrn Appiah  To: Dr. Florian Bragg: 12/5/2023 12:01 PM EST  Subject: Vilazodone prescription    Good Afternoon Dr. Manuel Dotson: This medication is up for refill. [facility] said [facility] ran on on 11/28 and are not sure when they will receive more. I have 19 days left. [facility] will keep refill on order, but recommended I contact you [facility].     Thank you    Levi Winter

## 2023-12-07 RX ORDER — VILAZODONE HYDROCHLORIDE 40 MG/1
40 TABLET ORAL DAILY
Qty: 30 TABLET | Refills: 0 | Status: SHIPPED | OUTPATIENT
Start: 2023-12-07

## 2023-12-20 ENCOUNTER — TELEPHONE (OUTPATIENT)
Dept: INTERNAL MEDICINE CLINIC | Facility: CLINIC | Age: 63
End: 2023-12-20

## 2023-12-20 NOTE — TELEPHONE ENCOUNTER
Please inform patient that her recent mammogram showed no evidence of malignancy.  Repeat mammogram in 1 year.    Based on mammogram findings however at her history she started to have increased  5-year risk for breast cancer of 2.3% and a recommendation was made by [facility] that she enroll in breast cancer prevention clinic.    Please let me know if she is interested in attending the breast cancer prevention clinic so that I can refer her.

## 2024-02-05 DIAGNOSIS — E89.0 HYPOTHYROIDISM FOLLOWING RADIOIODINE THERAPY: ICD-10-CM

## 2024-02-05 LAB
T4 FREE SERPL-MCNC: 1.1 NG/DL (ref 0.78–1.46)
TSH, 3RD GENERATION: 1.41 UIU/ML (ref 0.36–3.74)

## 2024-02-07 ENCOUNTER — OFFICE VISIT (OUTPATIENT)
Dept: ENDOCRINOLOGY | Age: 64
End: 2024-02-07
Payer: OTHER GOVERNMENT

## 2024-02-07 VITALS
WEIGHT: 139 LBS | HEART RATE: 81 BPM | DIASTOLIC BLOOD PRESSURE: 70 MMHG | BODY MASS INDEX: 24.62 KG/M2 | OXYGEN SATURATION: 98 % | SYSTOLIC BLOOD PRESSURE: 126 MMHG

## 2024-02-07 DIAGNOSIS — Z86.39 HISTORY OF HYPERTHYROIDISM: ICD-10-CM

## 2024-02-07 DIAGNOSIS — E89.0 HYPOTHYROIDISM FOLLOWING RADIOIODINE THERAPY: Primary | ICD-10-CM

## 2024-02-07 DIAGNOSIS — Z86.39 HISTORY OF GRAVES' DISEASE: ICD-10-CM

## 2024-02-07 PROCEDURE — 99213 OFFICE O/P EST LOW 20 MIN: CPT | Performed by: INTERNAL MEDICINE

## 2024-02-07 RX ORDER — LEVOTHYROXINE SODIUM 88 UG/1
88 TABLET ORAL
Qty: 90 TABLET | Refills: 3 | Status: SHIPPED | OUTPATIENT
Start: 2024-02-07

## 2024-02-07 ASSESSMENT — ENCOUNTER SYMPTOMS
CONSTIPATION: 0
DIARRHEA: 0

## 2024-02-07 NOTE — PROGRESS NOTES
ERIK Smallwood MD, [facility] ENDOCRINOLOGY   AND   THYROID NODULE CLINIC            Reason for visit: History of hypothyroidism      ASSESSMENT AND PLAN:    1. Hypothyroidism following radioiodine therapy  Kasandra Araujo is biochemically euthyroid.  Continue thyroid hormone replacement as prescribed.   Residual symptoms are attributable to something other than thyroid dysfunction.   Repeat thyroid function tests prior to the next appointment.  - levothyroxine (SYNTHROID) 88 MCG tablet; Take 1 tablet by mouth every morning (before breakfast)  Dispense: 90 tablet; Refill: 3  - TSH; Future  - T4, Free; Future    2. History of hyperthyroidism    3. History of Graves' disease      Follow-up and Dispositions    Return in about 6 months (around 8/7/2024).           History of Present Illness:    THYROID DYSFUNCTION  Kasandra Araujo is seen for follow-up of hypothyroidism.  She was diagnosed with hyperthyroidism/Graves' disease in 1994 and was treated with radioactive iodine and rendered hypothyroid thereafter.       Current symptoms:  See review of systems below     Prior treatment: She has been on levothyroxine or Synthroid since diagnosis.  The most recent therapy adjustments have been as follows:  -Synthroid 50 mcg daily to Synthroid 50 mcg daily 3 days/week and generic levothyroxine 75 mcg daily 4 days/week 3/15/2021  -Synthroid 88 mcg daily 4/28/2021     Pertinent labs:  11/6/2015: TSH 2.600.  3/10/2016: TSH 2.550.  8/11/2016: TSH 3.940.  4/24/2017: TSH 2.000.  9/26/2017: TSH 6.720.  11/21/2017: TSH 2.480.  3/15/2018: TSH 4.220.  9/18/2018: TSH 1.710.  1/21/2019: TSH 0.467.  4/18/2019: TSH 0.275.  6/27/2019: TSH 0.072, free T4 2.00.  8/28/2019: TSH 0.155.  10/22/2019: TSH 0.092.  5/27/2020: TSH 3.180.  7/21/2020: TSH 7.560.  9/10/2020: TSH 0.058.  11/20/2020: TSH 0.129, T4 10.3, free thyroxine index 3.3.  1/11/2021: TSH 0.150, T4 8.7, free thyroxine index 2.5, free T3 2.8.  3/8/2021: TSH

## 2024-03-21 ENCOUNTER — TELEPHONE (OUTPATIENT)
Dept: FAMILY MEDICINE CLINIC | Facility: CLINIC | Age: 64
End: 2024-03-21

## 2024-03-21 NOTE — TELEPHONE ENCOUNTER
----- Message from Zoila Maria Guadalupe sent at 3/21/2024 11:19 AM EDT -----  Subject: Message to Provider    QUESTIONS  Information for Provider? Patient is wanting to have a lab appointment   scheduled. Please call her back to schedule. Thank you. She is going to be   out of town the week before her appointment.   ---------------------------------------------------------------------------  --------------  CALL BACK INFO  [phone]; OK to leave message on voicemail  ---------------------------------------------------------------------------  --------------  SCRIPT ANSWERS  Relationship to Patient? Self

## 2024-03-27 ENCOUNTER — TELEPHONE (OUTPATIENT)
Dept: FAMILY MEDICINE CLINIC | Facility: CLINIC | Age: 64
End: 2024-03-27

## 2024-03-27 NOTE — TELEPHONE ENCOUNTER
----- Message from Zoila Maria Guadalupe sent at 3/21/2024 11:19 AM EDT -----  Subject: Message to Provider    QUESTIONS  Information for Provider? Patient is wanting to have a lab appointment   scheduled. Please call her back to schedule. Thank you. She is going to be   out of town the week before her appointment.   ---------------------------------------------------------------------------  --------------  CALL BACK INFO  [phone]; OK to leave message on voicemail  ---------------------------------------------------------------------------  --------------  SCRIPT ANSWERS  Relationship to Patient? Self

## 2024-03-28 ENCOUNTER — TELEPHONE (OUTPATIENT)
Dept: FAMILY MEDICINE CLINIC | Facility: CLINIC | Age: 64
End: 2024-03-28

## 2024-03-28 DIAGNOSIS — E11.69 HYPERLIPIDEMIA ASSOCIATED WITH TYPE 2 DIABETES MELLITUS (HCC): Primary | ICD-10-CM

## 2024-03-28 DIAGNOSIS — E11.65 TYPE 2 DIABETES MELLITUS WITH HYPERGLYCEMIA, WITHOUT LONG-TERM CURRENT USE OF INSULIN (HCC): ICD-10-CM

## 2024-03-28 DIAGNOSIS — E03.9 HYPOTHYROIDISM, UNSPECIFIED TYPE: ICD-10-CM

## 2024-03-28 DIAGNOSIS — E78.5 HYPERLIPIDEMIA ASSOCIATED WITH TYPE 2 DIABETES MELLITUS (HCC): Primary | ICD-10-CM

## 2024-03-28 NOTE — TELEPHONE ENCOUNTER
----- Message from Naina Carvalho sent at 3/28/2024  3:27 PM EDT -----  Subject: Message to Provider    QUESTIONS  Information for Provider? PT returned call regarding rescheduling. Would   need to come in for labs on 4/15. Would come in person or phone later in   April. Please f/u.  ---------------------------------------------------------------------------  --------------  CALL BACK INFO  [phone]; OK to leave message on voicemail  ---------------------------------------------------------------------------  --------------  SCRIPT ANSWERS  undefined

## 2024-04-03 DIAGNOSIS — N95.2 VAGINAL ATROPHY: Primary | ICD-10-CM

## 2024-04-03 RX ORDER — ESTRADIOL 0.1 MG/G
1 CREAM VAGINAL
Qty: 42.5 G | Refills: 3 | Status: SHIPPED | OUTPATIENT
Start: 2024-04-04

## 2024-04-15 ENCOUNTER — NURSE ONLY (OUTPATIENT)
Dept: INTERNAL MEDICINE CLINIC | Facility: CLINIC | Age: 64
End: 2024-04-15

## 2024-04-15 DIAGNOSIS — E78.5 HYPERLIPIDEMIA ASSOCIATED WITH TYPE 2 DIABETES MELLITUS: ICD-10-CM

## 2024-04-15 DIAGNOSIS — E11.65 TYPE 2 DIABETES MELLITUS WITH HYPERGLYCEMIA, WITHOUT LONG-TERM CURRENT USE OF INSULIN (HCC): ICD-10-CM

## 2024-04-15 DIAGNOSIS — E11.69 HYPERLIPIDEMIA ASSOCIATED WITH TYPE 2 DIABETES MELLITUS: ICD-10-CM

## 2024-04-15 LAB
ALBUMIN SERPL-MCNC: 3.7 G/DL (ref 3.2–4.6)
ALBUMIN/GLOB SERPL: 1 (ref 0.4–1.6)
ALP SERPL-CCNC: 48 U/L (ref 50–136)
ALT SERPL-CCNC: 30 U/L (ref 12–65)
ANION GAP SERPL CALC-SCNC: 0 MMOL/L (ref 2–11)
APPEARANCE UR: CLEAR
AST SERPL-CCNC: 14 U/L (ref 15–37)
BASOPHILS # BLD: 0.1 K/UL (ref 0–0.2)
BASOPHILS NFR BLD: 1 % (ref 0–2)
BILIRUB SERPL-MCNC: 0.5 MG/DL (ref 0.2–1.1)
BILIRUB UR QL: NEGATIVE
BUN SERPL-MCNC: 15 MG/DL (ref 8–23)
CALCIUM SERPL-MCNC: 9.3 MG/DL (ref 8.3–10.4)
CHLORIDE SERPL-SCNC: 109 MMOL/L (ref 103–113)
CHOLEST SERPL-MCNC: 188 MG/DL
CO2 SERPL-SCNC: 30 MMOL/L (ref 21–32)
COLOR UR: NORMAL
CREAT SERPL-MCNC: 0.8 MG/DL (ref 0.6–1)
CREAT UR-MCNC: 153 MG/DL
DIFFERENTIAL METHOD BLD: NORMAL
EOSINOPHIL # BLD: 0.2 K/UL (ref 0–0.8)
EOSINOPHIL NFR BLD: 2 % (ref 0.5–7.8)
ERYTHROCYTE [DISTWIDTH] IN BLOOD BY AUTOMATED COUNT: 12.4 % (ref 11.9–14.6)
GLOBULIN SER CALC-MCNC: 3.8 G/DL (ref 2.8–4.5)
GLUCOSE SERPL-MCNC: 100 MG/DL (ref 65–100)
GLUCOSE UR STRIP.AUTO-MCNC: NEGATIVE MG/DL
HCT VFR BLD AUTO: 40.9 % (ref 35.8–46.3)
HDLC SERPL-MCNC: 79 MG/DL (ref 40–60)
HDLC SERPL: 2.4
HGB BLD-MCNC: 13.3 G/DL (ref 11.7–15.4)
HGB UR QL STRIP: NEGATIVE
IMM GRANULOCYTES # BLD AUTO: 0 K/UL (ref 0–0.5)
IMM GRANULOCYTES NFR BLD AUTO: 0 % (ref 0–5)
KETONES UR QL STRIP.AUTO: NEGATIVE MG/DL
LDLC SERPL CALC-MCNC: 90.8 MG/DL
LEUKOCYTE ESTERASE UR QL STRIP.AUTO: NEGATIVE
LYMPHOCYTES # BLD: 2.6 K/UL (ref 0.5–4.6)
LYMPHOCYTES NFR BLD: 33 % (ref 13–44)
MCH RBC QN AUTO: 30.6 PG (ref 26.1–32.9)
MCHC RBC AUTO-ENTMCNC: 32.5 G/DL (ref 31.4–35)
MCV RBC AUTO: 94 FL (ref 82–102)
MICROALBUMIN UR-MCNC: 0.68 MG/DL
MICROALBUMIN/CREAT UR-RTO: 4 MG/G (ref 0–30)
MONOCYTES # BLD: 0.6 K/UL (ref 0.1–1.3)
MONOCYTES NFR BLD: 7 % (ref 4–12)
NEUTS SEG # BLD: 4.6 K/UL (ref 1.7–8.2)
NEUTS SEG NFR BLD: 57 % (ref 43–78)
NITRITE UR QL STRIP.AUTO: NEGATIVE
NRBC # BLD: 0 K/UL (ref 0–0.2)
PH UR STRIP: 6.5 (ref 5–9)
PLATELET # BLD AUTO: 257 K/UL (ref 150–450)
PMV BLD AUTO: 10.1 FL (ref 9.4–12.3)
POTASSIUM SERPL-SCNC: 4 MMOL/L (ref 3.5–5.1)
PROT SERPL-MCNC: 7.5 G/DL (ref 6.3–8.2)
PROT UR STRIP-MCNC: NEGATIVE MG/DL
RBC # BLD AUTO: 4.35 M/UL (ref 4.05–5.2)
SODIUM SERPL-SCNC: 139 MMOL/L (ref 136–146)
SP GR UR REFRACTOMETRY: 1.02 (ref 1–1.02)
TRIGL SERPL-MCNC: 91 MG/DL (ref 35–150)
UROBILINOGEN UR QL STRIP.AUTO: 0.2 EU/DL (ref 0.2–1)
VLDLC SERPL CALC-MCNC: 18.2 MG/DL (ref 6–23)
WBC # BLD AUTO: 8.1 K/UL (ref 4.3–11.1)

## 2024-04-16 LAB
EST. AVERAGE GLUCOSE BLD GHB EST-MCNC: 126 MG/DL
HBA1C MFR BLD: 6 % (ref 4.8–5.6)

## 2024-05-01 ENCOUNTER — TELEMEDICINE (OUTPATIENT)
Dept: INTERNAL MEDICINE CLINIC | Facility: CLINIC | Age: 64
End: 2024-05-01
Payer: OTHER GOVERNMENT

## 2024-05-01 DIAGNOSIS — F32.A DEPRESSIVE DISORDER: ICD-10-CM

## 2024-05-01 DIAGNOSIS — F41.9 ANXIETY: ICD-10-CM

## 2024-05-01 DIAGNOSIS — J45.20 MILD INTERMITTENT ASTHMA WITHOUT COMPLICATION: ICD-10-CM

## 2024-05-01 DIAGNOSIS — G43.009 MIGRAINE WITHOUT AURA, NOT INTRACTABLE, WITHOUT STATUS MIGRAINOSUS: ICD-10-CM

## 2024-05-01 DIAGNOSIS — E03.9 HYPOTHYROIDISM, UNSPECIFIED TYPE: ICD-10-CM

## 2024-05-01 DIAGNOSIS — E11.65 TYPE 2 DIABETES MELLITUS WITH HYPERGLYCEMIA, WITHOUT LONG-TERM CURRENT USE OF INSULIN (HCC): Primary | ICD-10-CM

## 2024-05-01 DIAGNOSIS — E11.69 HYPERLIPIDEMIA ASSOCIATED WITH TYPE 2 DIABETES MELLITUS (HCC): ICD-10-CM

## 2024-05-01 DIAGNOSIS — E78.5 HYPERLIPIDEMIA ASSOCIATED WITH TYPE 2 DIABETES MELLITUS (HCC): ICD-10-CM

## 2024-05-01 PROCEDURE — 3044F HG A1C LEVEL LT 7.0%: CPT | Performed by: INTERNAL MEDICINE

## 2024-05-01 PROCEDURE — 99214 OFFICE O/P EST MOD 30 MIN: CPT | Performed by: INTERNAL MEDICINE

## 2024-05-01 RX ORDER — HYDROXYZINE PAMOATE 25 MG/1
CAPSULE ORAL
COMMUNITY
Start: 2024-04-24

## 2024-05-01 RX ORDER — HYDROXYZINE HYDROCHLORIDE 25 MG/1
25 TABLET, FILM COATED ORAL 3 TIMES DAILY PRN
COMMUNITY

## 2024-05-01 SDOH — ECONOMIC STABILITY: FOOD INSECURITY: WITHIN THE PAST 12 MONTHS, THE FOOD YOU BOUGHT JUST DIDN'T LAST AND YOU DIDN'T HAVE MONEY TO GET MORE.: NEVER TRUE

## 2024-05-01 SDOH — ECONOMIC STABILITY: INCOME INSECURITY: HOW HARD IS IT FOR YOU TO PAY FOR THE VERY BASICS LIKE FOOD, HOUSING, MEDICAL CARE, AND HEATING?: NOT VERY HARD

## 2024-05-01 SDOH — ECONOMIC STABILITY: FOOD INSECURITY: WITHIN THE PAST 12 MONTHS, YOU WORRIED THAT YOUR FOOD WOULD RUN OUT BEFORE YOU GOT MONEY TO BUY MORE.: NEVER TRUE

## 2024-05-01 ASSESSMENT — PATIENT HEALTH QUESTIONNAIRE - PHQ9
2. FEELING DOWN, DEPRESSED OR HOPELESS: NOT AT ALL
5. POOR APPETITE OR OVEREATING: NOT AT ALL
8. MOVING OR SPEAKING SO SLOWLY THAT OTHER PEOPLE COULD HAVE NOTICED. OR THE OPPOSITE, BEING SO FIGETY OR RESTLESS THAT YOU HAVE BEEN MOVING AROUND A LOT MORE THAN USUAL: NOT AT ALL
3. TROUBLE FALLING OR STAYING ASLEEP: SEVERAL DAYS
SUM OF ALL RESPONSES TO PHQ QUESTIONS 1-9: 2
4. FEELING TIRED OR HAVING LITTLE ENERGY: SEVERAL DAYS
SUM OF ALL RESPONSES TO PHQ QUESTIONS 1-9: 2
10. IF YOU CHECKED OFF ANY PROBLEMS, HOW DIFFICULT HAVE THESE PROBLEMS MADE IT FOR YOU TO DO YOUR WORK, TAKE CARE OF THINGS AT HOME, OR GET ALONG WITH OTHER PEOPLE: NOT DIFFICULT AT ALL
9. THOUGHTS THAT YOU WOULD BE BETTER OFF DEAD, OR OF HURTING YOURSELF: NOT AT ALL
7. TROUBLE CONCENTRATING ON THINGS, SUCH AS READING THE NEWSPAPER OR WATCHING TELEVISION: NOT AT ALL
6. FEELING BAD ABOUT YOURSELF - OR THAT YOU ARE A FAILURE OR HAVE LET YOURSELF OR YOUR FAMILY DOWN: NOT AT ALL
SUM OF ALL RESPONSES TO PHQ9 QUESTIONS 1 & 2: 0
1. LITTLE INTEREST OR PLEASURE IN DOING THINGS: NOT AT ALL
SUM OF ALL RESPONSES TO PHQ QUESTIONS 1-9: 2
SUM OF ALL RESPONSES TO PHQ QUESTIONS 1-9: 2

## 2024-05-01 NOTE — PROGRESS NOTES
2024    Kasandra Araujo (: 1960) is a 63 y.o. female, Established patientpatient, here for evaluation of the following chief complaint(s):     Chief Complaint   Patient presents with    Follow-up         Assessment & Plan:     1. Type 2 diabetes mellitus with hyperglycemia, without long-term current use of insulin (HCC)  -     metFORMIN (GLUCOPHAGE) 500 MG tablet; Take one tablet a day with evening meal, Disp-90 tablet, R-3Normal  2. Hyperlipidemia associated with type 2 diabetes mellitus (HCC)  -     atorvastatin (LIPITOR) 10 MG tablet; Take 1 tablet by mouth nightly, Disp-90 tablet, R-2Normal  3. Depressive disorder  -     vilazodone HCl (VIIBRYD) 40 MG TABS; Take 1 tablet by mouth daily, Disp-90 tablet, R-3Normal  -     buPROPion (WELLBUTRIN XL) 300 MG extended release tablet; Take 1 tablet by mouth every morning, Disp-90 tablet, R-3Normal  4. Hypothyroidism, unspecified type  5. Anxiety    Patient seems to be doing well.  Test results discussed today.    Diabetes mellitus is well-controlled.  Cholesterol is at goal.  She has remained asymptomatic for asthma and migraine headaches.  Mood is stable.    Prescription management: No changes made in her medications.  Prescriptions refilled    Recommend :  Balanced diet Rich in plant proteins,  whole grains, vegetables and fruit. low in carbohydrates and added sugars.  Low salt/DASH diet recommended for HTN. Goal for treatment is less than 130/80mmHg.   Recommend low fat low cholesterol diet .  Mediterranean/heart healthy diet.    Reduce use  of saturated fat, trans fat and cholesterol.  EXERCISE : At least 150 min /week of moderate intensity aerobic activity spread over more than 3 days, with not  more than 2 consecutive days without exercise        Return in about 6 months (around 2024) for Chronic visit follow up, EOV, Fasting labs one week prior.     Subjective:    HPI:   Patient is here to follow up on Hypertension, Diabetes and

## 2024-05-04 RX ORDER — FLUTICASONE PROPIONATE 50 MCG
2 SPRAY, SUSPENSION (ML) NASAL DAILY PRN
Qty: 16 G | Refills: 3 | Status: SHIPPED | OUTPATIENT
Start: 2024-05-04

## 2024-05-04 RX ORDER — VILAZODONE HYDROCHLORIDE 40 MG/1
40 TABLET ORAL DAILY
Qty: 90 TABLET | Refills: 3 | Status: SHIPPED | OUTPATIENT
Start: 2024-05-04

## 2024-05-04 RX ORDER — ATORVASTATIN CALCIUM 10 MG/1
10 TABLET, FILM COATED ORAL NIGHTLY
Qty: 90 TABLET | Refills: 2 | Status: SHIPPED | OUTPATIENT
Start: 2024-05-04

## 2024-05-04 RX ORDER — BUPROPION HYDROCHLORIDE 300 MG/1
300 TABLET ORAL EVERY MORNING
Qty: 90 TABLET | Refills: 3 | Status: SHIPPED | OUTPATIENT
Start: 2024-05-04

## 2024-05-22 ENCOUNTER — PATIENT MESSAGE (OUTPATIENT)
Dept: INTERNAL MEDICINE CLINIC | Facility: CLINIC | Age: 64
End: 2024-05-22

## 2024-05-22 DIAGNOSIS — J45.20 MILD INTERMITTENT ASTHMA WITHOUT COMPLICATION: Primary | ICD-10-CM

## 2024-05-23 RX ORDER — ALBUTEROL SULFATE 90 UG/1
2 AEROSOL, METERED RESPIRATORY (INHALATION) EVERY 4 HOURS PRN
Qty: 18 G | Refills: 3 | Status: SHIPPED | OUTPATIENT
Start: 2024-05-23

## 2024-05-23 NOTE — TELEPHONE ENCOUNTER
From: Kasandra Araujo  To: Dr. Desire Morgan  Sent: 5/22/2024 10:41 PM EDT  Subject: Refill on inhaler    Hello Dr. Desire Anne:    I had needed refills on my Albuterol Sulfate inhaler.  I believe last time I made a request, there had been a shortage issue.    Now I see that I cannot get a refill without a visit. Do I need to schedule an in office visit for this, or an e visit.     Please let me know as early as possible. Moving out of area on June 7.    Thank you    Kasandra Araujo

## 2024-05-23 NOTE — TELEPHONE ENCOUNTER
LOV: 5/1/24 VV  Next appt: none scheduled at time of encounter. To return in about 6 months (11/1/24) for chronic visit followup, labs 1 week prior.     Rx pended.    Dr. Pham out of office until 6/3/24

## 2024-05-29 ENCOUNTER — PATIENT MESSAGE (OUTPATIENT)
Dept: INTERNAL MEDICINE CLINIC | Facility: CLINIC | Age: 64
End: 2024-05-29

## 2024-05-29 DIAGNOSIS — J45.20 MILD INTERMITTENT ASTHMA WITHOUT COMPLICATION: ICD-10-CM

## 2024-05-30 RX ORDER — ALBUTEROL SULFATE 90 UG/1
2 AEROSOL, METERED RESPIRATORY (INHALATION) EVERY 6 HOURS PRN
Qty: 18 G | Refills: 3 | Status: SHIPPED | OUTPATIENT
Start: 2024-05-30

## 2024-05-30 NOTE — TELEPHONE ENCOUNTER
Contacted [facility] and spoke with Viola, pharmacy tech, and she states that patient's albuterol Rx was cancelled as the directions needed clarification.     She states that a new Rx [facility] with the correct directions for this medication.     Rx pended.

## 2024-05-30 NOTE — TELEPHONE ENCOUNTER
From: Kasandra Araujo  To: Dr. Desire Morgan  Sent: 5/29/2024 3:13 PM EDT  Subject: Prescription    I received a phone call from [facility] that they needed a clarification on a recent prescription. I am presuming it for the inhaler. If you, or someone, in the office could contact, I would appreciate it.    Thank you    Kasandra Araujo

## 2024-05-31 ENCOUNTER — TELEPHONE (OUTPATIENT)
Dept: INTERNAL MEDICINE CLINIC | Facility: CLINIC | Age: 64
End: 2024-05-31

## 2024-05-31 NOTE — TELEPHONE ENCOUNTER
[facility] RX clarification form faxed 5/30/24. Confirmation received 5/30/24. To be uploaded to chart.

## 2024-10-17 ENCOUNTER — PATIENT MESSAGE (OUTPATIENT)
Dept: INTERNAL MEDICINE CLINIC | Facility: CLINIC | Age: 64
End: 2024-10-17

## 2024-10-17 DIAGNOSIS — F32.A DEPRESSIVE DISORDER: ICD-10-CM

## 2024-10-17 DIAGNOSIS — E11.65 TYPE 2 DIABETES MELLITUS WITH HYPERGLYCEMIA, WITHOUT LONG-TERM CURRENT USE OF INSULIN (HCC): ICD-10-CM

## 2024-10-18 NOTE — TELEPHONE ENCOUNTER
\"Hi Dr Pham:     I had a prescription refill, but it was sent to my old address.  The postal  went the address, picked up the 2 prescriptions.  They had opened the mail package.  PO taped it up with a note, and forwarded to me.  That was now 2 weeks ago and I have not received.  I contacted [facility] about the situation they will send replacement.     However, I am now out of metformin and almost out of Valizdone.  They said I should contact you about getting a short term supply - 10 days.  Until scripts come in the Mail.  I do not have an appointment to see an internist until March 7.  So no DR yet here in [place].     If the order can be sent to the [address] [address], phone #[phone].   Please let me know if this is possible.  Thank you so much!  Hope you are doing well.     Kasandra Araujo\"        Rx pended.

## 2024-10-20 RX ORDER — VILAZODONE HYDROCHLORIDE 40 MG/1
40 TABLET ORAL DAILY
Qty: 10 TABLET | Refills: 0 | Status: SHIPPED | OUTPATIENT
Start: 2024-10-20

## 2024-11-20 DIAGNOSIS — E11.9 TYPE 2 DIABETES MELLITUS WITHOUT COMPLICATION, WITHOUT LONG-TERM CURRENT USE OF INSULIN (HCC): ICD-10-CM

## 2024-11-20 DIAGNOSIS — G43.009 MIGRAINE WITHOUT AURA, NOT INTRACTABLE, WITHOUT STATUS MIGRAINOSUS: ICD-10-CM

## 2024-11-20 RX ORDER — BLOOD-GLUCOSE METER
1 KIT MISCELLANEOUS DAILY
Qty: 100 EACH | Refills: 3 | Status: CANCELLED | OUTPATIENT
Start: 2024-11-20

## 2024-11-20 NOTE — TELEPHONE ENCOUNTER
Refill request on:    blood glucose test strips (FREESTYLE LITE) strip  SUMAtriptan-Naproxen Sodium (TREXIMET)  MG     LOV- 10/10/23  Next Appt-  no appt schedule at this time     RX pended   Thank you!       Note: it was last prescribed on 9/29/22 by   Please advise   Thank you!

## 2024-11-22 RX ORDER — SUMATRIPTAN AND NAPROXEN SODIUM 85; 500 MG/1; MG/1
TABLET, FILM COATED ORAL
Qty: 9 TABLET | Refills: 0 | Status: SHIPPED | OUTPATIENT
Start: 2024-11-22

## 2024-11-22 RX ORDER — BLOOD-GLUCOSE METER
1 KIT MISCELLANEOUS DAILY
Qty: 100 EACH | Refills: 3 | Status: SHIPPED | OUTPATIENT
Start: 2024-11-22

## 2024-11-25 RX ORDER — BLOOD-GLUCOSE METER
1 KIT MISCELLANEOUS DAILY
Qty: 100 EACH | Refills: 3 | Status: SHIPPED | OUTPATIENT
Start: 2024-11-25

## (undated) DEVICE — 1010 S-DRAPE TOWEL DRAPE 10/BX: Brand: STERI-DRAPE™

## (undated) DEVICE — NEEDLE HYPO 25GA L1.5IN BLU POLYPR HUB S STL REG BVL STR

## (undated) DEVICE — HAND PACK: Brand: MEDLINE INDUSTRIES, INC.

## (undated) DEVICE — GLOVE SURG SZ 65 L12IN FNGR THK79MIL GRN LTX FREE

## (undated) DEVICE — DRESSING,GAUZE,XEROFORM,CURAD,1"X8",ST: Brand: CURAD

## (undated) DEVICE — BNDG,ELSTC,MATRIX,STRL,2"X5YD,LF,HOOK&LP: Brand: MEDLINE

## (undated) DEVICE — DRAPE,U/SHT,SPLIT,FILM,60X84,STERILE: Brand: MEDLINE

## (undated) DEVICE — SUTURE ETHLN SZ 4-0 L18IN NONABSORBABLE BLK L19MM PS-2 3/8 1667H

## (undated) DEVICE — SOLUTION IRRIG 1000ML 09% SOD CHL USP PIC PLAS CONTAINER

## (undated) DEVICE — PADDING CAST W3INXL4YD COT BLEND MIC PLEAT UNDERCAST SPEC

## (undated) DEVICE — GLOVE SURG SZ 65 THK91MIL LTX FREE SYN POLYISOPRENE

## (undated) DEVICE — STRIP,CLOSURE,WOUND,MEDI-STRIP,1/2X4: Brand: MEDLINE

## (undated) DEVICE — DISPOSABLE BIPOLAR CODE, 12' (3.66 M): Brand: CONMED

## (undated) DEVICE — Device

## (undated) DEVICE — ZIMMER® STERILE DISPOSABLE TOURNIQUET CUFF WITH PLC, DUAL PORT, SINGLE BLADDER, 18 IN. (46 CM)

## (undated) DEVICE — 48" PROBE COVER W/GEL, ULTRASOUND, STERILE: Brand: SITE-RITE

## (undated) DEVICE — MASTISOL ADHESIVE LIQ 2/3ML

## (undated) DEVICE — GEL US 20GM NONIRRITATING OVERWRAPPED FILE PCH TRNSMIT

## (undated) DEVICE — SYRINGE IRRIG 60ML SFT PLIABLE BLB EZ TO GRP 1 HND USE W/

## (undated) DEVICE — NEEDLE HYPO 21GA L1.5IN INTRAMUSCULAR S STL LATCH BVL UP